# Patient Record
Sex: MALE | Race: BLACK OR AFRICAN AMERICAN | NOT HISPANIC OR LATINO | Employment: OTHER | URBAN - METROPOLITAN AREA
[De-identification: names, ages, dates, MRNs, and addresses within clinical notes are randomized per-mention and may not be internally consistent; named-entity substitution may affect disease eponyms.]

---

## 2017-02-23 ENCOUNTER — PRE VISIT (OUTPATIENT)
Dept: ORTHOPEDICS | Facility: CLINIC | Age: 42
End: 2017-02-23

## 2017-02-23 NOTE — TELEPHONE ENCOUNTER
1.  Date/reason for appt: 3/1/17 7:20AM Rt toe pain appt per Edita from [facility]. Ref by Dr. Lira.  No outside records  2.  Referring provider: Dr. Lira   3.  Call to patient (Yes / No - short description): No, per appt notes there are no outside recs.   4.  Previous care at / records requested from: Recs are in Epic   Ov notes w/ Dr. Lira

## 2017-03-01 ENCOUNTER — OFFICE VISIT (OUTPATIENT)
Dept: ORTHOPEDICS | Facility: CLINIC | Age: 42
End: 2017-03-01

## 2017-03-01 VITALS — WEIGHT: 167.6 LBS | BODY MASS INDEX: 24.82 KG/M2 | HEIGHT: 69 IN

## 2017-03-01 DIAGNOSIS — L84 TYLOMA: Primary | ICD-10-CM

## 2017-03-01 DIAGNOSIS — I73.9 PVD (PERIPHERAL VASCULAR DISEASE) (H): ICD-10-CM

## 2017-03-01 DIAGNOSIS — M21.42 PES PLANUS OF BOTH FEET: ICD-10-CM

## 2017-03-01 DIAGNOSIS — M21.41 PES PLANUS OF BOTH FEET: ICD-10-CM

## 2017-03-01 DIAGNOSIS — M79.675 PAIN IN LEFT TOE(S): ICD-10-CM

## 2017-03-01 DIAGNOSIS — M20.42 HAMMER TOE OF LEFT FOOT: ICD-10-CM

## 2017-03-01 ASSESSMENT — ENCOUNTER SYMPTOMS
SINUS PAIN: 0
NECK MASS: 0
MYALGIAS: 0
SINUS CONGESTION: 0
MUSCLE WEAKNESS: 0
SORE THROAT: 0
SMELL DISTURBANCE: 0
STIFFNESS: 0
BACK PAIN: 0
JOINT SWELLING: 0
HOARSE VOICE: 0
NECK PAIN: 0
ARTHRALGIAS: 0
TROUBLE SWALLOWING: 0
MUSCLE CRAMPS: 0
TASTE DISTURBANCE: 0
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
## 2017-03-01 NOTE — NURSING NOTE
"Reason For Visit:   Chief Complaint   Patient presents with     Musculoskeletal Problem     Pain in left foot.        Ht 1.759 m (5' 9.25\")  Wt 76 kg (167 lb 9.6 oz)  BMI 24.57 kg/m2    Pain Assessment  Patient Currently in Pain: Yes  Primary Pain Location: Foot  Pain Orientation: Left  Pain Descriptors: Other (comment) (unable to give decriptions, states ' Shoe rubs againt th skin \" )  Alleviating Factors: Other (comment) (Nothing makes pain better. )  Aggravating Factors: Other (comment) (Wearing shoes)  "
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
## 2017-03-01 NOTE — MR AVS SNAPSHOT
After Visit Summary   3/1/2017    Maurice Yu    MRN: [id number]           Patient Information     Date Of Birth          1975        Visit Information        Provider Department      3/1/2017 7:05 AM Simone Hudson DPM; [employer] [facility]        Today's Diagnoses     Tyloma    -  1    Pes planus of both feet        Hammer toe of left foot        Pain in left toe(s)        PVD (peripheral vascular disease) (H)           Follow-ups after your visit        Who to contact     Please call your clinic at [phone] to:    Ask questions about your health    Make or cancel appointments    Discuss your medicines    Learn about your test results    Speak to your doctor   If you have compliments or concerns about an experience at your clinic, or if you wish to file a complaint, please contact [facility] Patient Relations at [phone] or email us at [email or web address]         Additional Information About Your Visit        MyChart Information     [employer] is an electronic gateway that provides easy, online access to your medical records. With [employer], you can request a clinic appointment, read your test results, renew a prescription or communicate with your care team.     To sign up for [facility] visit the website at [email or web address]   You will be asked to enter the access code listed below, as well as some personal information. Please follow the directions to create your username and password.     Your access code is: [email or web address]  Expires: 2017  6:31 AM     Your access code will  in 90 days. If you need help or a new code, please contact your [facility] or call [phone] for assistance.        Care EveryWhere ID     This is your Care EveryWhere ID. This could be used by other organizations to access your [place] medical records  [id number]        Your Vitals  "Were     Height BMI (Body Mass Index)                1.759 m (5' 9.25\") 24.57 kg/m2           Blood Pressure from Last 3 Encounters:   11/17/16 (!) 143/100   09/13/16 (!) 156/104   10/16/13 113/74    Weight from Last 3 Encounters:   03/01/17 76 kg (167 lb 9.6 oz)   11/17/16 77.6 kg (171 lb)   09/13/16 76.7 kg (169 lb)              We Performed the Following     TRIM HYPERKERATOTIC SKIN LESION, ONE        Primary Care Provider Office Phone # Fax #    Suri Rao [phone]       [facility] [address] [place] [address]        Thank you!     Thank you for choosing [facility]  for your care. Our goal is always to provide you with excellent care. Hearing back from our patients is one way we can continue to improve our services. Please take a few minutes to complete the written survey that you may receive in the mail after your visit with us. Thank you!             Your Updated Medication List - Protect others around you: Learn how to safely use, store and throw away your medicines at www.disposemymeds.org.          This list is accurate as of: 3/1/17  7:58 AM.  Always use your most recent med list.                   Brand Name Dispense Instructions for use    amitriptyline 50 MG tablet    ELAVIL     Take 25 mg by mouth 1 - 2 at bedtime       ASPIRIN PO      Take 325 mg by mouth Reported on 3/1/2017       FISH OIL + D3 PO      Take by mouth daily       lisinopril-hydrochlorothiazide 10-12.5 MG per tablet    PRINZIDE/ZESTORETIC    90 tablet    Take 1 tablet by mouth daily       loratadine 10 MG tablet    CLARITIN     Take 10 mg by mouth daily prn       MELATONIN PO      Take 10 mg by mouth At Bedtime Reported on 3/1/2017       omeprazole 40 MG capsule    priLOSEC    30 capsule    Take 1 capsule (40 mg) by mouth daily Take one capsule by mouth every day before meal       RANITIDINE HCL PO      Take 300 mg by mouth daily       " spironolactone-hydrochlorothiazide 25-25 MG per tablet    ALDACTAZIDE         tiZANidine 4 MG tablet    ZANAFLEX         Valerian 250 MG Caps      Take 250 mg by mouth

## 2017-08-16 ENCOUNTER — OFFICE VISIT (OUTPATIENT)
Dept: PLASTIC SURGERY | Facility: CLINIC | Age: 42
End: 2017-08-16

## 2017-08-16 VITALS
TEMPERATURE: 98 F | WEIGHT: 172.1 LBS | OXYGEN SATURATION: 96 % | HEART RATE: 80 BPM | DIASTOLIC BLOOD PRESSURE: 107 MMHG | SYSTOLIC BLOOD PRESSURE: 171 MMHG | BODY MASS INDEX: 25.23 KG/M2

## 2017-08-16 DIAGNOSIS — L90.5 SCAR: Primary | ICD-10-CM

## 2017-08-16 ASSESSMENT — PAIN SCALES - GENERAL: PAINLEVEL: NO PAIN (0)
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
## 2017-08-16 NOTE — NURSING NOTE
Chief Complaint   Patient presents with     Consult     consult for scar on neck        Vitals:    08/16/17 0906   BP: (!) 171/107   Pulse: 80   Temp: 98  F (36.7  C)   TempSrc: Oral   SpO2: 96%   Weight: 172 lb 1.6 oz       Body mass index is 25.23 kg/(m^2).      Stef WATTERS
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
## 2018-02-21 ENCOUNTER — OFFICE VISIT (OUTPATIENT)
Dept: FAMILY MEDICINE | Facility: CLINIC | Age: 43
End: 2018-02-21
Payer: COMMERCIAL

## 2018-02-21 VITALS
SYSTOLIC BLOOD PRESSURE: 112 MMHG | HEART RATE: 92 BPM | DIASTOLIC BLOOD PRESSURE: 88 MMHG | WEIGHT: 170.6 LBS | OXYGEN SATURATION: 97 % | TEMPERATURE: 97.6 F | BODY MASS INDEX: 25.01 KG/M2

## 2018-02-21 DIAGNOSIS — R73.9 ELEVATED BLOOD SUGAR: Primary | ICD-10-CM

## 2018-02-21 DIAGNOSIS — R53.83 OTHER FATIGUE: ICD-10-CM

## 2018-02-21 DIAGNOSIS — F51.05 INSOMNIA DUE TO OTHER MENTAL DISORDER: ICD-10-CM

## 2018-02-21 DIAGNOSIS — F32.1 MODERATE MAJOR DEPRESSION (H): ICD-10-CM

## 2018-02-21 DIAGNOSIS — F99 INSOMNIA DUE TO OTHER MENTAL DISORDER: ICD-10-CM

## 2018-02-21 DIAGNOSIS — F43.10 PTSD (POST-TRAUMATIC STRESS DISORDER): ICD-10-CM

## 2018-02-21 DIAGNOSIS — I10 BENIGN ESSENTIAL HYPERTENSION: ICD-10-CM

## 2018-02-21 LAB — GLUCOSE BLD-MCNC: 137 MG/DL (ref 70–99)

## 2018-02-21 PROCEDURE — 82947 ASSAY GLUCOSE BLOOD QUANT: CPT | Performed by: NURSE PRACTITIONER

## 2018-02-21 PROCEDURE — 36416 COLLJ CAPILLARY BLOOD SPEC: CPT | Performed by: NURSE PRACTITIONER

## 2018-02-21 PROCEDURE — 99204 OFFICE O/P NEW MOD 45 MIN: CPT | Performed by: NURSE PRACTITIONER

## 2018-02-21 NOTE — PATIENT INSTRUCTIONS
Today you declined further testing and your fasting blood sugar was elevated 137 today.     I do recommend the labs I ordered for you so please be sure you follow up with your regular Primary Care Provider for Diabetes and for Depression.     You can come here anytime as well, but should have one regular Provider in charge of helping you with your healthcare.     What Is Diabetes?  If you have diabetes, your body does not make enough insulin (a hormone), or the insulin it makes does not work the way it should. Diabetes is a lifelong disease.  Glucose (sugar) is your body's main source of energy. Insulin carries glucose from the bloodstream into your body's cells. But if you have diabetes, glucose builds up in your blood. This is called high blood glucose (high blood sugar).  High blood glucose can lead to damage in many parts of your body, including your eyes, kidneys, heart, blood vessels, nerves and skin.  What are the symptoms of diabetes?  Symptoms include:    Extreme thirst    Needing to urinate more often    Headache    Hunger    Blurred vision    Feeling drowsy or tired    Slow healing after an illness or injury    Frequent infections.  What should I do if I have diabetes?  Your first step is to learn how to manage your diabetes. The goal is to keep your blood glucose as close to normal as possible. (A normal level is 70 to 100.) By doing this, you can prevent or control damage to your body.  To manage your diabetes, you will need to:    Eat a wide range of healthy foods.    Manage your weight.    Be physically active.    Test your blood glucose as prescribed.    Control your blood pressure and cholesterol.    Take your medicines as prescribed.  Are there different kinds of diabetes?  There are two basic kinds of diabetes: type 1 and type 2.  Type 1 diabetes  Type 1 diabetes occurs when the cells that make insulin are destroyed by your body's immune system. Your body can no longer make insulin on its own.  People who have type 1 diabetes must take insulin to manage it.  Type 2 diabetes  Type 2 diabetes occurs when the cells of your body cannot use insulin or glucose normally. Over time, your body cannot make enough insulin to meet your body's needs. This is the most common kind of diabetes.  You are more likely to develop type 2 diabetes if you:    Are overweight    Have high blood pressure    Have high cholesterol    Are not physically active    Have a family history of type 2 diabetes    Are , /, ,  American or     Are a woman who has given birth to a baby weighing over 9 pounds, or you have had gestational diabetes (diabetes that occurs in pregnancy).  For informational purposes only. Not to replace the advice of your health care provider.  Copyright   2006 [facility]. All rights reserved. [employer] [id number] - REV 12/15.

## 2018-02-21 NOTE — MR AVS SNAPSHOT
After Visit Summary   2/21/2018    Maurice Yu    MRN: [id number]           Patient Information     Date Of Birth          1975        Visit Information        Provider Department      2/21/2018 8:45 AM Veronica Baron APRN CNP; [facility]        Today's Diagnoses     Elevated blood sugar    -  1    Benign essential hypertension        Insomnia due to other mental disorder        Moderate major depression (H)        PTSD (post-traumatic stress disorder)        Other fatigue          Care Instructions    Today you declined further testing and your fasting blood sugar was elevated 137 today.     I do recommend the labs I ordered for you so please be sure you follow up with your regular Primary Care Provider for Diabetes and for Depression.     You can come here anytime as well, but should have one regular Provider in charge of helping you with your healthcare.     What Is Diabetes?  If you have diabetes, your body does not make enough insulin (a hormone), or the insulin it makes does not work the way it should. Diabetes is a lifelong disease.  Glucose (sugar) is your body's main source of energy. Insulin carries glucose from the bloodstream into your body's cells. But if you have diabetes, glucose builds up in your blood. This is called high blood glucose (high blood sugar).  High blood glucose can lead to damage in many parts of your body, including your eyes, kidneys, heart, blood vessels, nerves and skin.  What are the symptoms of diabetes?  Symptoms include:    Extreme thirst    Needing to urinate more often    Headache    Hunger    Blurred vision    Feeling drowsy or tired    Slow healing after an illness or injury    Frequent infections.  What should I do if I have diabetes?  Your first step is to learn how to manage your diabetes. The goal is to keep your blood glucose as close to normal as possible. (A normal level is 70 to 100.) By doing  this, you can prevent or control damage to your body.  To manage your diabetes, you will need to:    Eat a wide range of healthy foods.    Manage your weight.    Be physically active.    Test your blood glucose as prescribed.    Control your blood pressure and cholesterol.    Take your medicines as prescribed.  Are there different kinds of diabetes?  There are two basic kinds of diabetes: type 1 and type 2.  Type 1 diabetes  Type 1 diabetes occurs when the cells that make insulin are destroyed by your body's immune system. Your body can no longer make insulin on its own. People who have type 1 diabetes must take insulin to manage it.  Type 2 diabetes  Type 2 diabetes occurs when the cells of your body cannot use insulin or glucose normally. Over time, your body cannot make enough insulin to meet your body's needs. This is the most common kind of diabetes.  You are more likely to develop type 2 diabetes if you:    Are overweight    Have high blood pressure    Have high cholesterol    Are not physically active    Have a family history of type 2 diabetes    Are , /, ,  American or     Are a woman who has given birth to a baby weighing over 9 pounds, or you have had gestational diabetes (diabetes that occurs in pregnancy).  For informational purposes only. Not to replace the advice of your health care provider.  Copyright   2006 [employer]. All rights reserved. [employer] [id number] - REV 12/15.            Follow-ups after your visit        Future tests that were ordered for you today     Open Future Orders        Priority Expected Expires Ordered    Lipid panel reflex to direct LDL Fasting Routine 1/22/2019 2/21/2019 2/21/2018            Who to contact     If you have questions or need follow up information about today's clinic visit or your schedule please contact [facility] directly at [phone].  Normal or non-critical lab  "and imaging results will be communicated to you by MyChart, letter or phone within 4 business days after the clinic has received the results. If you do not hear from us within 7 days, please contact the clinic through [employer] or phone. If you have a critical or abnormal lab result, we will notify you by phone as soon as possible.  Submit refill requests through [employer] or call your pharmacy and they will forward the refill request to us. Please allow 3 business days for your refill to be completed.          Additional Information About Your Visit        [employer] Information     [employer] lets you send messages to your doctor, view your test results, renew your prescriptions, schedule appointments and more. To sign up, go to [email or web address] . Click on \"Log in\" on the left side of the screen, which will take you to the Welcome page. Then click on \"Sign up Now\" on the right side of the page.     You will be asked to enter the access code listed below, as well as some personal information. Please follow the directions to create your username and password.     Your access code is: [email or web address]  Expires: 2018 10:15 AM     Your access code will  in 90 days. If you need help or a new code, please call your [facility] clinic or [phone].        Care EveryWhere ID     This is your Care EveryWhere ID. This could be used by other organizations to access your [facility] medical records  [id number]        Your Vitals Were     Pulse Temperature Pulse Oximetry BMI (Body Mass Index)          92 97.6  F (36.4  C) (Oral) 97% 25.01 kg/m2         Blood Pressure from Last 3 Encounters:   18 112/88   17 (!) 171/107   16 (!) 143/100    Weight from Last 3 Encounters:   18 170 lb 9.6 oz (77.4 kg)   17 172 lb 1.6 oz (78.1 kg)   17 167 lb 9.6 oz (76 kg)              We Performed the Following     CBC with platelets differential     Comprehensive metabolic panel     DEPRESSION ACTION PLAN " (DAP)     Glucose, whole blood     Hemoglobin A1c     TSH with free T4 reflex        Primary Care Provider Fax #    Physician No Ref-Primary [phone]       No address on file        Equal Access to Services     KALEIGH ALVARADO : Hadii aad ku hadrashmimello Hwang, eliza lexixavier, daly lombardo, deniz pride laArnavadele higgins. [facility] [phone].    ATENCIÓN: Si habla español, tiene a chicas disposición servicios gratuitos de asistencia lingüística. Llame al [phone].    We comply with applicable federal civil rights laws and [place] laws. We do not discriminate on the basis of race, color, national origin, age, disability, sex, sexual orientation, or gender identity.            Thank you!     Thank you for choosing [facility]  for your care. Our goal is always to provide you with excellent care. Hearing back from our patients is one way we can continue to improve our services. Please take a few minutes to complete the written survey that you may receive in the mail after your visit with us. Thank you!             Your Updated Medication List - Protect others around you: Learn how to safely use, store and throw away your medicines at www.disposemymeds.org.          This list is accurate as of 2/21/18 10:15 AM.  Always use your most recent med list.                   Brand Name Dispense Instructions for use Diagnosis    lisinopril-hydrochlorothiazide 10-12.5 MG per tablet    PRINZIDE/ZESTORETIC    90 tablet    Take 1 tablet by mouth daily    PTSD (post-traumatic stress disorder), Insomnia, Hypertension goal BP (blood pressure) < 140/90       loratadine 10 MG tablet    CLARITIN     Take 10 mg by mouth daily prn        RANITIDINE HCL PO      Take 300 mg by mouth daily        spironolactone-hydrochlorothiazide 25-25 MG per tablet    ALDACTAZIDE

## 2018-09-06 ENCOUNTER — MEDICAL CORRESPONDENCE (OUTPATIENT)
Dept: HEALTH INFORMATION MANAGEMENT | Facility: CLINIC | Age: 43
End: 2018-09-06

## 2018-09-11 DIAGNOSIS — G47.9 SLEEP DISTURBANCE: Primary | ICD-10-CM

## 2018-09-17 ENCOUNTER — OFFICE VISIT (OUTPATIENT)
Dept: SLEEP MEDICINE | Facility: CLINIC | Age: 43
End: 2018-09-17
Payer: COMMERCIAL

## 2018-09-17 VITALS
SYSTOLIC BLOOD PRESSURE: 141 MMHG | WEIGHT: 171 LBS | DIASTOLIC BLOOD PRESSURE: 94 MMHG | HEART RATE: 77 BPM | HEIGHT: 69 IN | RESPIRATION RATE: 16 BRPM | BODY MASS INDEX: 25.33 KG/M2 | OXYGEN SATURATION: 94 %

## 2018-09-17 DIAGNOSIS — F51.03 PARADOXICAL INSOMNIA: Primary | ICD-10-CM

## 2018-09-17 DIAGNOSIS — I10 ESSENTIAL HYPERTENSION: ICD-10-CM

## 2018-09-17 DIAGNOSIS — G47.9 SLEEP DISTURBANCE: ICD-10-CM

## 2018-09-17 PROCEDURE — 99205 OFFICE O/P NEW HI 60 MIN: CPT | Performed by: NURSE PRACTITIONER

## 2018-09-17 RX ORDER — INSULIN PUMP SYRINGE, 3 ML
EACH MISCELLANEOUS
COMMUNITY
Start: 2018-02-22

## 2018-09-17 RX ORDER — OMEPRAZOLE 40 MG/1
40 CAPSULE, DELAYED RELEASE ORAL
COMMUNITY
Start: 2018-08-07

## 2018-09-17 RX ORDER — TEMAZEPAM 15 MG/1
CAPSULE ORAL
COMMUNITY
Start: 2018-09-06

## 2018-09-17 RX ORDER — LANCETS
EACH MISCELLANEOUS
COMMUNITY
Start: 2018-02-22

## 2018-09-17 NOTE — NURSING NOTE
"Rechecked blood pressure.    Vital signs:      BP: (!) 141/94 Pulse: 77   Resp: 16 SpO2: 94 %     Height: 175.3 cm (5' 9\") Weight: 77.6 kg (171 lb)  Estimated body mass index is 25.25 kg/(m^2) as calculated from the following:    Height as of this encounter: 1.753 m (5' 9\").    Weight as of this encounter: 77.6 kg (171 lb).      Sarah Mcintosh [facility] ~[place]    Patient declined truthpoint because he said he was to tired to answer the questions.  "

## 2018-09-17 NOTE — MR AVS SNAPSHOT
After Visit Summary   9/17/2018    Maurice Yu    MRN: [id number]           Patient Information     Date Of Birth          1975        Visit Information        Provider Department      9/17/2018 9:30 AM Jazmin Viatl APRN CNP; [employer] [facility]        Today's Diagnoses     Paradoxical insomnia    -  1    Sleep disturbance          Care Instructions    Move taking the pill for your thyroid to the morning on an empty stomach  Sleep watch actigraphy: 1 week without sleep pill, 2nd week take sleep pill  Cover clock and don't look at cell phone  Follow up with me  Your BMI is Body mass index is 25.25 kg/(m^2).  Weight management is a personal decision.  If you are interested in exploring weight loss strategies, the following discussion covers the approaches that may be successful. Body mass index (BMI) is one way to tell whether you are at a healthy weight, overweight, or obese. It measures your weight in relation to your height.  A BMI of 18.5 to 24.9 is in the healthy range. A person with a BMI of 25 to 29.9 is considered overweight, and someone with a BMI of 30 or greater is considered obese. More than two-thirds of American adults are considered overweight or obese.  Being overweight or obese increases the risk for further weight gain. Excess weight may lead to heart disease and diabetes.  Creating and following plans for healthy eating and physical activity may help you improve your health.  Weight control is part of healthy lifestyle and includes exercise, emotional health, and healthy eating habits. Careful eating habits lifelong are the mainstay of weight control. Though there are significant health benefits from weight loss, long-term weight loss with diet alone may be very difficult to achieve- studies show long-term success with dietary management in less than 10% of people. Attaining a healthy weight may be especially difficult to  achieve in those with severe obesity. In some cases, medications, devices and surgical management might be considered.  What can you do?  If you are overweight or obese and are interested in methods for weight loss, you should discuss this with your provider.     Consider reducing daily calorie intake by 500 calories.     Keep a food journal.     Avoiding skipping meals, consider cutting portions instead.    Diet combined with exercise helps maintain muscle while optimizing fat loss. Strength training is particularly important for building and maintaining muscle mass. Exercise helps reduce stress, increase energy, and improves fitness. Increasing exercise without diet control, however, may not burn enough calories to loose weight.       Start walking three days a week 10-20 minutes at a time    Work towards walking thirty minutes five days a week     Eventually, increase the speed of your walking for 1-2 minutes at time    In addition, we recommend that you review healthy lifestyles and methods for weight loss available through the National Institutes of Health patient information sites:  http://win.niddk.nih.gov/publications/index.htm    And look into health and wellness programs that may be available through your health insurance provider, employer, local community center, or dorcas club.    Weight management plan: Patient was referred to their PCP to discuss a diet and exercise plan.    Your blood pressure was checked while you were in clinic today.  Please read the guidelines below about what these numbers mean and what you should do about them.  Your systolic blood pressure is the top number.  This is the pressure when the heart is pumping.  Your diastolic blood pressure is the bottom number.  This is the pressure in between beats.  If your systolic blood pressure is less than 120 and your diastolic blood pressure is less than 80, then your blood pressure is normal. There is nothing more that you need to do  "about it  If your systolic blood pressure is 120-139 or your diastolic blood pressure is 80-89, your blood pressure may be higher than it should be.  You should have your blood pressure re-checked within a year by a primary care provider.  If your systolic blood pressure is 140 or greater or your diastolic blood pressure is 90 or greater, you may have high blood pressure.  High blood pressure is treatable, but if left untreated over time it can put you at risk for heart attack, stroke, or kidney failure.  You should have your blood pressure re-checked by a primary care provider within the next four weeks.            Follow-ups after your visit        Follow-up notes from your care team     Return for bp check, actigraphy for 2 wks with a follow up visit.      Future tests that were ordered for you today     Open Future Orders        Priority Expected Expires Ordered    Comprehensive Sleep Study Routine  3/16/2019 9/17/2018            Who to contact     If you have questions or need follow up information about today's clinic visit or your schedule please contact [facility] directly at [phone].  Normal or non-critical lab and imaging results will be communicated to you by MyChart, letter or phone within 4 business days after the clinic has received the results. If you do not hear from us within 7 days, please contact the clinic through [employer] or phone. If you have a critical or abnormal lab result, we will notify you by phone as soon as possible.  Submit refill requests through [facility] or call your pharmacy and they will forward the refill request to us. Please allow 3 business days for your refill to be completed.          Additional Information About Your Visit        [employer] Information     [facility] lets you send messages to your doctor, view your test results, renew your prescriptions, schedule appointments and more. To sign up, go to [email or web address] . Click on \"Log in\" on the " "left side of the screen, which will take you to the Welcome page. Then click on \"Sign up Now\" on the right side of the page.     You will be asked to enter the access code listed below, as well as some personal information. Please follow the directions to create your username and password.     Your access code is: [email or web address]  Expires: 2018  9:35 AM     Your access code will  in 90 days. If you need help or a new code, please call your [facility] clinic or [phone].        Care EveryWhere ID     This is your Care EveryWhere ID. This could be used by other organizations to access your [facility] medical records  [id number]        Your Vitals Were     Pulse Respirations Height Pulse Oximetry BMI (Body Mass Index)       77 16 1.753 m (5' 9\") 94% 25.25 kg/m2        Blood Pressure from Last 3 Encounters:   18 (!) 141/94   18 112/88   17 (!) 171/107    Weight from Last 3 Encounters:   18 77.6 kg (171 lb)   18 77.4 kg (170 lb 9.6 oz)   17 78.1 kg (172 lb 1.6 oz)              We Performed the Following     SLEEP EVALUATION & MANAGEMENT REFERRAL - ADULT -[facility]  [phone] (Age 2 and up)        Primary Care Provider Office Phone # Fax #    [facility] [phone]       [facility]  [address]  [facility] [address]        Equal Access to Services     KALEIGH ALVARADO : Hadii aad ku hadasho Soomaali, waaxda luqadaha, qaybta kaalmada grupo, deniz dow . [facility] [phone].    ATENCIÓN: Si habla español, tiene a chicas disposición servicios gratuitos de asistencia lingüística. Llame al [phone].    We comply with applicable federal civil rights laws and [place] laws. We do not discriminate on the basis of race, color, national origin, age, disability, sex, sexual orientation, or gender identity.            Thank you!     Thank you for choosing [facility] " [place]  for your care. Our goal is always to provide you with excellent care. Hearing back from our patients is one way we can continue to improve our services. Please take a few minutes to complete the written survey that you may receive in the mail after your visit with us. Thank you!             Your Updated Medication List - Protect others around you: Learn how to safely use, store and throw away your medicines at www.disposemymeds.org.          This list is accurate as of 9/17/18 11:12 AM.  Always use your most recent med list.                   Brand Name Dispense Instructions for use Diagnosis    FIFTY50 GLUCOSE METER 2.0 w/Device Kit      as needed for blood glucose monitoring Please fill according to patient's formulary.        KROGER LANCETS 21G Misc      Up to 3 tests per day - Fill based on formulary        lisinopril-hydrochlorothiazide 10-12.5 MG per tablet    PRINZIDE/ZESTORETIC    90 tablet    Take 1 tablet by mouth daily    PTSD (post-traumatic stress disorder), Insomnia, Hypertension goal BP (blood pressure) < 140/90       loratadine 10 MG tablet    CLARITIN     Take 10 mg by mouth daily prn        metFORMIN 500 MG tablet    GLUCOPHAGE     Take 500 mg by mouth        omeprazole 40 MG capsule    priLOSEC     Take 40 mg by mouth        RANITIDINE HCL PO      Take 300 mg by mouth daily        spironolactone-hydrochlorothiazide 25-25 MG per tablet    ALDACTAZIDE          temazepam 15 MG capsule    RESTORIL

## 2018-09-17 NOTE — PATIENT INSTRUCTIONS
Move taking the pill for your thyroid to the morning on an empty stomach  Sleep watch actigraphy: 1 week without sleep pill, 2nd week take sleep pill  Cover clock and don't look at cell phone  Follow up with me  Your BMI is Body mass index is 25.25 kg/(m^2).  Weight management is a personal decision.  If you are interested in exploring weight loss strategies, the following discussion covers the approaches that may be successful. Body mass index (BMI) is one way to tell whether you are at a healthy weight, overweight, or obese. It measures your weight in relation to your height.  A BMI of 18.5 to 24.9 is in the healthy range. A person with a BMI of 25 to 29.9 is considered overweight, and someone with a BMI of 30 or greater is considered obese. More than two-thirds of American adults are considered overweight or obese.  Being overweight or obese increases the risk for further weight gain. Excess weight may lead to heart disease and diabetes.  Creating and following plans for healthy eating and physical activity may help you improve your health.  Weight control is part of healthy lifestyle and includes exercise, emotional health, and healthy eating habits. Careful eating habits lifelong are the mainstay of weight control. Though there are significant health benefits from weight loss, long-term weight loss with diet alone may be very difficult to achieve- studies show long-term success with dietary management in less than 10% of people. Attaining a healthy weight may be especially difficult to achieve in those with severe obesity. In some cases, medications, devices and surgical management might be considered.  What can you do?  If you are overweight or obese and are interested in methods for weight loss, you should discuss this with your provider.     Consider reducing daily calorie intake by 500 calories.     Keep a food journal.     Avoiding skipping meals, consider cutting portions instead.    Diet combined with  exercise helps maintain muscle while optimizing fat loss. Strength training is particularly important for building and maintaining muscle mass. Exercise helps reduce stress, increase energy, and improves fitness. Increasing exercise without diet control, however, may not burn enough calories to loose weight.       Start walking three days a week 10-20 minutes at a time    Work towards walking thirty minutes five days a week     Eventually, increase the speed of your walking for 1-2 minutes at time    In addition, we recommend that you review healthy lifestyles and methods for weight loss available through the National Institutes of Health patient information sites:  http://win.niddk.nih.gov/publications/index.htm    And look into health and wellness programs that may be available through your health insurance provider, employer, local community center, or [employer]or[employer] club.    Weight management plan: Patient was referred to their PCP to discuss a diet and exercise plan.    Your blood pressure was checked while you were in clinic today.  Please read the guidelines below about what these numbers mean and what you should do about them.  Your systolic blood pressure is the top number.  This is the pressure when the heart is pumping.  Your diastolic blood pressure is the bottom number.  This is the pressure in between beats.  If your systolic blood pressure is less than 120 and your diastolic blood pressure is less than 80, then your blood pressure is normal. There is nothing more that you need to do about it  If your systolic blood pressure is 120-139 or your diastolic blood pressure is 80-89, your blood pressure may be higher than it should be.  You should have your blood pressure re-checked within a year by a primary care provider.  If your systolic blood pressure is 140 or greater or your diastolic blood pressure is 90 or greater, you may have high blood pressure.  High blood pressure is treatable, but if left untreated  over time it can put you at risk for heart attack, stroke, or kidney failure.  You should have your blood pressure re-checked by a primary care provider within the next four weeks.
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
## 2018-09-17 NOTE — Clinical Note
See completed sleep consult, note info re: levothyroxine and Patient to follow up with Primary Care provider regarding elevated blood pressure.  ELISE Nascimento, CNP-BC Sleep Medicine
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
## 2018-10-08 ENCOUNTER — OFFICE VISIT (OUTPATIENT)
Dept: SLEEP MEDICINE | Facility: CLINIC | Age: 43
End: 2018-10-08
Payer: COMMERCIAL

## 2018-10-08 DIAGNOSIS — G47.19 EXCESSIVE DAYTIME SLEEPINESS: Primary | ICD-10-CM

## 2018-10-08 PROCEDURE — 95803 ACTIGRAPHY TESTING: CPT | Performed by: NURSE PRACTITIONER

## 2018-10-08 NOTE — PROGRESS NOTES
Patient presented to clinic for  and demonstration of the Actigraphy and sleep diary. Patient was set up and instructed use. Patient verbalized understanding and demonstrated set up back to me. Patient will be returning device by 10/22/18.    Patient was given sleep logs and written instructions for use.

## 2018-10-08 NOTE — MR AVS SNAPSHOT
"              After Visit Summary   10/8/2018    Maurice Yu    MRN: [id number]           Patient Information     Date Of Birth          1975        Visit Information        Provider Department      10/8/2018 10:15 AM [employer]; SLEEP STUDY  [facility]        Today's Diagnoses     Excessive daytime sleepiness    -  1       Follow-ups after your visit        Your next 10 appointments already scheduled     Oct 22, 2018 10:30 AM CDT   Actigraphy Drop Off with SLEEP STUDY  7   [facility])    [address] [place] [id number] [phone]            Oct 22, 2018 11:00 AM CDT   Return Sleep Patient with ELISE Barrow CNP   [facility])    [address] [place] [id number] [phone]              Who to contact     If you have questions or need follow up information about today's clinic visit or your schedule please contact [facility] directly at [phone].  Normal or non-critical lab and imaging results will be communicated to you by Zipmarkhart, letter or phone within 4 business days after the clinic has received the results. If you do not hear from us within 7 days, please contact the clinic through Zipmarkhart or phone. If you have a critical or abnormal lab result, we will notify you by phone as soon as possible.  Submit refill requests through [employer] or call your pharmacy and they will forward the refill request to us. Please allow 3 business days for your refill to be completed.          Additional Information About Your Visit        MyChart Information     [employer] lets you send messages to your doctor, view your test results, renew your prescriptions, schedule appointments and more. To sign up, go to [email or web address] . Click on \"Log in\" on " "the left side of the screen, which will take you to the Welcome page. Then click on \"Sign up Now\" on the right side of the page.     You will be asked to enter the access code listed below, as well as some personal information. Please follow the directions to create your username and password.     Your access code is: [email or web address]  Expires: 2018  9:35 AM     Your access code will  in 90 days. If you need help or a new code, please call your [place] clinic or [phone].        Care EveryWhere ID     This is your Care EveryWhere ID. This could be used by other organizations to access your [place] medical records  [id number]         Blood Pressure from Last 3 Encounters:   18 (!) 141/94   18 112/88   17 (!) 171/107    Weight from Last 3 Encounters:   18 77.6 kg (171 lb)   18 77.4 kg (170 lb 9.6 oz)   17 78.1 kg (172 lb 1.6 oz)              Today, you had the following     No orders found for display       Primary Care Provider Office Phone # Fax #    [facility] [phone]        [address]  [facility] [address]        Equal Access to Services     KALEIGH ALVARADO AH: Hadii pooja vieyrao Solexy, waaxda luqadaha, qaybta kaalmada adeegyada, deniz gil hayadele dow . [facility] [phone].    ATENCIÓN: Si habla español, tiene a chicas disposición servicios gratuitos de asistencia lingüística. Llame al [phone].    We comply with applicable federal civil rights laws and [place] laws. We do not discriminate on the basis of race, color, national origin, age, disability, sex, sexual orientation, or gender identity.            Thank you!     Thank you for choosing [facility]  for your care. Our goal is always to provide you with excellent care. Hearing back from our patients is one way we can continue to improve our services. Please take a few minutes to complete the written survey that you may " receive in the mail after your visit with us. Thank you!             Your Updated Medication List - Protect others around you: Learn how to safely use, store and throw away your medicines at www.disposemymeds.org.          This list is accurate as of 10/8/18  3:37 PM.  Always use your most recent med list.                   Brand Name Dispense Instructions for use Diagnosis    FIFTY50 GLUCOSE METER 2.0 w/Device Kit      as needed for blood glucose monitoring Please fill according to patient's formulary.        [employer]ER LANCETS 21G Misc      Up to 3 tests per day - Fill based on formulary        lisinopril-hydrochlorothiazide 10-12.5 MG per tablet    PRINZIDE/ZESTORETIC    90 tablet    Take 1 tablet by mouth daily    PTSD (post-traumatic stress disorder), Insomnia, Hypertension goal BP (blood pressure) < 140/90       loratadine 10 MG tablet    CLARITIN     Take 10 mg by mouth daily prn        metFORMIN 500 MG tablet    GLUCOPHAGE     Take 500 mg by mouth        omeprazole 40 MG capsule    priLOSEC     Take 40 mg by mouth        RANITIDINE HCL PO      Take 300 mg by mouth daily        spironolactone-hydrochlorothiazide 25-25 MG per tablet    ALDACTAZIDE          temazepam 15 MG capsule    RESTORIL

## 2018-10-22 ENCOUNTER — OFFICE VISIT (OUTPATIENT)
Dept: SLEEP MEDICINE | Facility: CLINIC | Age: 43
End: 2018-10-22
Payer: COMMERCIAL

## 2018-10-22 ENCOUNTER — DOCUMENTATION ONLY (OUTPATIENT)
Dept: SLEEP MEDICINE | Facility: CLINIC | Age: 43
End: 2018-10-22
Payer: COMMERCIAL

## 2018-10-22 VITALS
RESPIRATION RATE: 16 BRPM | HEART RATE: 101 BPM | BODY MASS INDEX: 25.18 KG/M2 | SYSTOLIC BLOOD PRESSURE: 135 MMHG | WEIGHT: 170 LBS | OXYGEN SATURATION: 95 % | HEIGHT: 69 IN | DIASTOLIC BLOOD PRESSURE: 93 MMHG

## 2018-10-22 DIAGNOSIS — Z78.9 INEFFECTIVE SELF HEALTH MANAGEMENT: ICD-10-CM

## 2018-10-22 DIAGNOSIS — F51.03 PARADOXICAL INSOMNIA: Primary | ICD-10-CM

## 2018-10-22 PROCEDURE — 99214 OFFICE O/P EST MOD 30 MIN: CPT | Performed by: NURSE PRACTITIONER

## 2018-10-22 NOTE — NURSING NOTE
"    Chief Complaint   Patient presents with     Study Results     Acti       Initial BP (!) 135/93  Pulse 101  Resp 16  Ht 1.753 m (5' 9.02\")  Wt 77.1 kg (170 lb)  SpO2 95%  BMI 25.09 kg/m2 Estimated body mass index is 25.09 kg/(m^2) as calculated from the following:    Height as of this encounter: 1.753 m (5' 9.02\").    Weight as of this encounter: 77.1 kg (170 lb).    Medication Reconciliation: complete    Neck circumference:  inches /  centimeters.    DME:     [facility] Mcintosh [facility]       "

## 2018-10-22 NOTE — MR AVS SNAPSHOT
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  Will talk with Dr. Lira re: sleep pill and working on anxiety, check on timing of medications.  Bring in sleep diary if you have it.      Your BMI is Body mass index is 25.09 kg/(m^2).  Weight management is a personal decision.  If you are interested in exploring weight loss strategies, the following discussion covers the approaches that may be successful. Body mass index (BMI) is one way to tell whether you are at a healthy weight, overweight, or obese. It measures your weight in relation to your height.  A BMI of 18.5 to 24.9 is in the healthy range. A person with a BMI of 25 to 29.9 is considered overweight, and someone with a BMI of 30 or greater is considered obese. More than two-thirds of American adults are considered overweight or obese.  Being overweight or obese increases the risk for further weight gain. Excess weight may lead to heart disease and diabetes.  Creating and following plans for healthy eating and physical activity may help you improve your health.  Weight control is part of healthy lifestyle and includes exercise, emotional health, and healthy eating habits. Careful eating habits lifelong are the mainstay of weight control. Though there are significant health benefits from weight loss, long-term weight loss with diet alone may be very difficult to achieve- studies show long-term success with dietary management in less than 10% of people. Attaining a healthy weight may be especially difficult to achieve in those with  severe obesity. In some cases, medications, devices and surgical management might be considered.  What can you do?  If you are overweight or obese and are interested in methods for weight loss, you should discuss this with your provider.     Consider reducing daily calorie intake by 500 calories.     Keep a food journal.     Avoiding skipping meals, consider cutting portions instead.    Diet combined with exercise helps maintain muscle while optimizing fat loss. Strength training is particularly important for building and maintaining muscle mass. Exercise helps reduce stress, increase energy, and improves fitness. Increasing exercise without diet control, however, may not burn enough calories to loose weight.       Start walking three days a week 10-20 minutes at a time    Work towards walking thirty minutes five days a week     Eventually, increase the speed of your walking for 1-2 minutes at time    In addition, we recommend that you review healthy lifestyles and methods for weight loss available through the National Institutes of Health patient information sites:  http://win.niddk.nih.gov/publications/index.htm    And look into health and wellness programs that may be available through your health insurance provider, employer, local community center, or dorcas club.    Weight management plan: Patient was referred to their PCP to discuss a diet and exercise plan.     Your blood pressure was checked while you were in clinic today.  Please read the guidelines below about what these numbers mean and what you should do about them.  Your systolic blood pressure is the top number.  This is the pressure when the heart is pumping.  Your diastolic blood pressure is the bottom number.  This is the pressure in between beats.  If your systolic blood pressure is less than 120 and your diastolic blood pressure is less than 80, then your blood pressure is normal. There is nothing more that you need to do about it  If your  "systolic blood pressure is 120-139 or your diastolic blood pressure is 80-89, your blood pressure may be higher than it should be.  You should have your blood pressure re-checked within a year by a primary care provider.  If your systolic blood pressure is 140 or greater or your diastolic blood pressure is 90 or greater, you may have high blood pressure.  High blood pressure is treatable, but if left untreated over time it can put you at risk for heart attack, stroke, or kidney failure.  You should have your blood pressure re-checked by a primary care provider within the next four weeks.                Follow-ups after your visit        Follow-up notes from your care team     Return if symptoms worsen or fail to improve.      Who to contact     If you have questions or need follow up information about today's clinic visit or your schedule please contact [facility] directly at [phone].  Normal or non-critical lab and imaging results will be communicated to you by MyChart, letter or phone within 4 business days after the clinic has received the results. If you do not hear from us within 7 days, please contact the clinic through MyChart or phone. If you have a critical or abnormal lab result, we will notify you by phone as soon as possible.  Submit refill requests through [employer] or call your pharmacy and they will forward the refill request to us. Please allow 3 business days for your refill to be completed.          Additional Information About Your Visit        Care EveryWhere ID     This is your Care EveryWhere ID. This could be used by other organizations to access your [place] [facility] medical records  [id number]        Your Vitals Were     Pulse Respirations Height Pulse Oximetry BMI (Body Mass Index)       101 16 1.753 m (5' 9.02\") 95% 25.09 kg/m2        Blood Pressure from Last 3 Encounters:   10/22/18 (!) 135/93   09/17/18 (!) 141/94   02/21/18 112/88    Weight from Last 3 Encounters: "   10/22/18 77.1 kg (170 lb)   09/17/18 77.6 kg (171 lb)   02/21/18 77.4 kg (170 lb 9.6 oz)              Today, you had the following     No orders found for display       Primary Care Provider Office Phone # Fax #    [facility] [phone]       [address]  [facility] [place] [address]        Equal Access to Services     KALEIGH ALVARADO : Hadii aad ku hadasho Soomaali, waaxda luqadaha, qaybta kaalmada adeegyada, waxay idiin hayaan adeeg bigg paloma . So [facility] [phone].    ATENCIÓN: Si melissa narvaez, tiene a chicas disposición servicios gratuitos de asistencia lingüística. Amitaame al [phone].    We comply with applicable federal civil rights laws and [place] laws. We do not discriminate on the basis of race, color, national origin, age, disability, sex, sexual orientation, or gender identity.            Thank you!     Thank you for choosing [facility]  for your care. Our goal is always to provide you with excellent care. Hearing back from our patients is one way we can continue to improve our services. Please take a few minutes to complete the written survey that you may receive in the mail after your visit with us. Thank you!             Your Updated Medication List - Protect others around you: Learn how to safely use, store and throw away your medicines at www.disposemymeds.org.          This list is accurate as of 10/22/18 11:57 AM.  Always use your most recent med list.                   Brand Name Dispense Instructions for use Diagnosis    FIFTY50 GLUCOSE METER 2.0 w/Device Kit      as needed for blood glucose monitoring Please fill according to patient's formulary.        [employer] LANCETS 21G Misc      Up to 3 tests per day - Fill based on formulary        lisinopril-hydrochlorothiazide 10-12.5 MG per tablet    PRINZIDE/ZESTORETIC    90 tablet    Take 1 tablet by mouth daily    PTSD (post-traumatic stress disorder), Insomnia, Hypertension goal BP (blood  pressure) < 140/90       loratadine 10 MG tablet    CLARITIN     Take 10 mg by mouth daily prn        metFORMIN 500 MG tablet    GLUCOPHAGE     Take 500 mg by mouth        omeprazole 40 MG capsule    priLOSEC     Take 40 mg by mouth        RANITIDINE HCL PO      Take 300 mg by mouth daily        spironolactone-hydrochlorothiazide 25-25 MG per tablet    ALDACTAZIDE          temazepam 15 MG capsule    RESTORIL
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
## 2018-10-22 NOTE — PATIENT INSTRUCTIONS
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  Will talk with Dr. Pankaj woo: sleep pill and working on anxiety, check on timing of medications.  Bring in sleep diary if you have it.      Your BMI is Body mass index is 25.09 kg/(m^2).  Weight management is a personal decision.  If you are interested in exploring weight loss strategies, the following discussion covers the approaches that may be successful. Body mass index (BMI) is one way to tell whether you are at a healthy weight, overweight, or obese. It measures your weight in relation to your height.  A BMI of 18.5 to 24.9 is in the healthy range. A person with a BMI of 25 to 29.9 is considered overweight, and someone with a BMI of 30 or greater is considered obese. More than two-thirds of American adults are considered overweight or obese.  Being overweight or obese increases the risk for further weight gain. Excess weight may lead to heart disease and diabetes.  Creating and following plans for healthy eating and physical activity may help you improve your health.  Weight control is part of healthy lifestyle and includes exercise, emotional health, and healthy eating habits. Careful eating habits lifelong are the mainstay of weight control. Though there are significant health benefits from weight loss, long-term weight loss with diet alone may be very difficult to achieve- studies show long-term success with dietary management in less than 10% of people. Attaining a healthy weight may be especially difficult to achieve in those with severe obesity. In some cases, medications, devices and surgical management might be considered.  What can you do?  If you are overweight or obese and are interested in methods for weight loss, you should discuss this with your provider.     Consider reducing daily calorie intake by 500 calories.     Keep a food journal.     Avoiding skipping  meals, consider cutting portions instead.    Diet combined with exercise helps maintain muscle while optimizing fat loss. Strength training is particularly important for building and maintaining muscle mass. Exercise helps reduce stress, increase energy, and improves fitness. Increasing exercise without diet control, however, may not burn enough calories to loose weight.       Start walking three days a week 10-20 minutes at a time    Work towards walking thirty minutes five days a week     Eventually, increase the speed of your walking for 1-2 minutes at time    In addition, we recommend that you review healthy lifestyles and methods for weight loss available through the National Institutes of Health patient information sites:  http://win.niddk.nih.gov/publications/index.htm    And look into health and wellness programs that may be available through your health insurance provider, employer, local community center, or [employer]orcas club.    Weight management plan: Patient was referred to their PCP to discuss a diet and exercise plan.     Your blood pressure was checked while you were in clinic today.  Please read the guidelines below about what these numbers mean and what you should do about them.  Your systolic blood pressure is the top number.  This is the pressure when the heart is pumping.  Your diastolic blood pressure is the bottom number.  This is the pressure in between beats.  If your systolic blood pressure is less than 120 and your diastolic blood pressure is less than 80, then your blood pressure is normal. There is nothing more that you need to do about it  If your systolic blood pressure is 120-139 or your diastolic blood pressure is 80-89, your blood pressure may be higher than it should be.  You should have your blood pressure re-checked within a year by a primary care provider.  If your systolic blood pressure is 140 or greater or your diastolic blood pressure is 90 or greater, you may have high blood  pressure.  High blood pressure is treatable, but if left untreated over time it can put you at risk for heart attack, stroke, or kidney failure.  You should have your blood pressure re-checked by a primary care provider within the next four weeks.
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
## 2021-02-04 ENCOUNTER — HOSPITAL ENCOUNTER (EMERGENCY)
Facility: CLINIC | Age: 46
Discharge: HOME | End: 2021-02-04
Attending: EMERGENCY MEDICINE | Admitting: EMERGENCY MEDICINE
Payer: MEDICAID

## 2021-02-04 VITALS
HEIGHT: 72 IN | WEIGHT: 198 LBS | DIASTOLIC BLOOD PRESSURE: 88 MMHG | RESPIRATION RATE: 18 BRPM | OXYGEN SATURATION: 98 % | SYSTOLIC BLOOD PRESSURE: 124 MMHG | HEART RATE: 89 BPM | BODY MASS INDEX: 26.82 KG/M2
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
## 2021-02-04 DIAGNOSIS — R22.0 FACIAL SWELLING: ICD-10-CM

## 2021-02-04 PROCEDURE — 250N000013 HC RX MED GY IP 250 OP 250 PS 637: Performed by: EMERGENCY MEDICINE

## 2021-02-04 PROCEDURE — 99283 EMERGENCY DEPT VISIT LOW MDM: CPT

## 2021-02-04 RX ORDER — ACETAMINOPHEN 325 MG/1
650 TABLET ORAL ONCE
Status: COMPLETED | OUTPATIENT
Start: 2021-02-04 | End: 2021-02-04

## 2021-02-04 RX ADMIN — ACETAMINOPHEN 650 MG: 325 TABLET, FILM COATED ORAL at 13:41

## 2021-02-04 ASSESSMENT — ENCOUNTER SYMPTOMS
ABDOMINAL PAIN: 0
FREQUENCY: 0
DIFFICULTY URINATING: 0
CONSTIPATION: 0
DYSURIA: 0
DIARRHEA: 0
BLOOD IN STOOL: 0
HEMATURIA: 0
BACK PAIN: 1
NECK PAIN: 1

## 2021-02-04 ASSESSMENT — MIFFLIN-ST. JEOR: SCORE: 1806.12
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
## 2021-02-09 ENCOUNTER — OFFICE VISIT (OUTPATIENT)
Dept: INTERNAL MEDICINE | Facility: CLINIC | Age: 46
End: 2021-02-09

## 2021-02-09 VITALS
DIASTOLIC BLOOD PRESSURE: 84 MMHG | TEMPERATURE: 98.5 F | OXYGEN SATURATION: 98 % | WEIGHT: 198.1 LBS | HEART RATE: 81 BPM | BODY MASS INDEX: 26.87 KG/M2 | SYSTOLIC BLOOD PRESSURE: 122 MMHG
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
## 2021-02-09 DIAGNOSIS — S01.81XD FACIAL LACERATION, SUBSEQUENT ENCOUNTER: ICD-10-CM

## 2021-02-09 DIAGNOSIS — Z48.02 ENCOUNTER FOR REMOVAL OF SUTURES: Primary | ICD-10-CM

## 2021-02-09 PROCEDURE — 99024 POSTOP FOLLOW-UP VISIT: CPT | Performed by: INTERNAL MEDICINE
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
## 2021-05-04 ENCOUNTER — HOSPITAL ENCOUNTER (OUTPATIENT)
Dept: ULTRASOUND IMAGING | Facility: CLINIC | Age: 46
Discharge: HOME | End: 2021-05-04
Attending: NURSE PRACTITIONER | Admitting: NURSE PRACTITIONER
Payer: COMMERCIAL

## 2021-05-04 DIAGNOSIS — E03.9 HYPOTHYROIDISM, UNSPECIFIED TYPE: ICD-10-CM

## 2021-05-04 DIAGNOSIS — Z86.39 HX OF GOITER: ICD-10-CM

## 2021-05-04 PROCEDURE — 76536 US EXAM OF HEAD AND NECK: CPT | Mod: 26 | Performed by: RADIOLOGY

## 2021-05-04 PROCEDURE — 76536 US EXAM OF HEAD AND NECK: CPT

## 2021-07-08 ENCOUNTER — OFFICE VISIT (OUTPATIENT)
Dept: URGENT CARE | Facility: URGENT CARE | Age: 46
End: 2021-07-08
Payer: MEDICAID

## 2021-07-08 VITALS
RESPIRATION RATE: 16 BRPM | SYSTOLIC BLOOD PRESSURE: 133 MMHG | DIASTOLIC BLOOD PRESSURE: 90 MMHG | TEMPERATURE: 96.7 F | OXYGEN SATURATION: 100 % | HEART RATE: 60 BPM | WEIGHT: 175 LBS

## 2021-07-08 DIAGNOSIS — H57.13 PAIN OF BOTH EYES: Primary | ICD-10-CM

## 2021-07-08 PROCEDURE — 99203 OFFICE O/P NEW LOW 30 MIN: CPT | Performed by: PHYSICIAN ASSISTANT

## 2021-07-08 ASSESSMENT — ENCOUNTER SYMPTOMS
EYE PAIN: 1
EYE ITCHING: 0
NEUROLOGICAL NEGATIVE: 1
GASTROINTESTINAL NEGATIVE: 1
CONSTITUTIONAL NEGATIVE: 1
EYE DISCHARGE: 0
RESPIRATORY NEGATIVE: 1
PHOTOPHOBIA: 0
PSYCHIATRIC NEGATIVE: 1
MUSCULOSKELETAL NEGATIVE: 1
EYE REDNESS: 0
CARDIOVASCULAR NEGATIVE: 1

## 2021-07-08 ASSESSMENT — VISUAL ACUITY: OU: 1

## 2021-07-08 NOTE — PROGRESS NOTES
Pain of both eyes  - EYE ADULT REFERRAL; Future    The etiology of patient's eye pain is unclear. Patient was asked to follow up with ophthalmology for further workup.     20 minutes spent on the date of the encounter doing chart review, history and exam, documentation and further activities per the note     See Patient Instructions  Patient Instructions     Patient Education     Acute Pain, Uncertain Cause  Pain can be caused by many conditions that range from very minor to very serious. In some cases, though, pain comes and goes with no apparent cause.   We were not able to find the exact cause for your pain. At this time there is no sign of any serious illness causing your pain. More tests may be needed to determine the cause. In many cases, pain like this goes away by itself.   Home care  Take any medicines as prescribed. If another medicine was not prescribed for pain, you can take an over-the-counter pain medicine such as ibuprofen or acetaminophen. Use these as directed on the label.     Follow-up care  Follow up with your healthcare provider or our staff as directed.  When to seek medical advice  Call your healthcare provider for any of the following:    Pain changes in pattern    Pain doesn't lessen or gets worse    New symptoms appear    Fever of 100.4 F (38 C) or higher, or as directed by your healthcare provider  [employer]-pins last reviewed this educational content on 4/1/2018 2000-2021 [employer]. All rights reserved. This information is not intended as a substitute for professional medical care. Always follow your healthcare professional's instructions.               Elkin Moreno PA-C  [facility]    Subjective   46 year old who presents to clinic today for the following health issues:    Eye Problem       HPI     Eye(s) Problem  Onset/Duration: 3-4 weeks  Description:   Location: Bilateral  Pain: YES  Redness: no  Accompanying Signs &  Symptoms:  Discharge/mattering: no  Swelling: no  Visual changes: no  Fever: no  Nasal Congestion: no  Bothered by bright lights: no  History:  Trauma: no  Foreign body exposure: no  Wearing contacts: no  Precipitating or alleviating factors: None  Therapies tried and outcome: None      Review of Systems   Review of Systems   Constitutional: Negative.    HENT: Negative.    Eyes: Positive for pain. Negative for photophobia, discharge, redness, itching and visual disturbance.   Respiratory: Negative.    Cardiovascular: Negative.    Gastrointestinal: Negative.    Genitourinary: Negative.    Musculoskeletal: Negative.    Neurological: Negative.    Psychiatric/Behavioral: Negative.         Objective    Temp: 96.7  F (35.9  C) Temp src: Tympanic BP: (!) 133/90 Pulse: 60   Resp: 16 SpO2: 100 %       Physical Exam   Physical Exam  Constitutional:       General: He is not in acute distress.     Appearance: Normal appearance. He is normal weight. He is not ill-appearing, toxic-appearing or diaphoretic.   HENT:      Head: Normocephalic and atraumatic.   Eyes:      General: Lids are normal. Lids are everted, no foreign bodies appreciated. Vision grossly intact. Gaze aligned appropriately. No allergic shiner, visual field deficit or scleral icterus.        Right eye: No foreign body, discharge or hordeolum.         Left eye: No foreign body, discharge or hordeolum.      Extraocular Movements: Extraocular movements intact.      Conjunctiva/sclera: Conjunctivae normal.      Right eye: Right conjunctiva is not injected. No chemosis, exudate or hemorrhage.     Left eye: Left conjunctiva is not injected. No chemosis, exudate or hemorrhage.     Pupils: Pupils are equal, round, and reactive to light.   Neck:      Musculoskeletal: Normal range of motion and neck supple. No neck rigidity or muscular tenderness.      Vascular: No carotid bruit.   Cardiovascular:      Rate and Rhythm: Normal rate and regular rhythm.      Pulses: Normal  pulses.      Heart sounds: Normal heart sounds. No murmur. No friction rub. No gallop.    Pulmonary:      Effort: Pulmonary effort is normal. No respiratory distress.      Breath sounds: Normal breath sounds.   Lymphadenopathy:      Cervical: No cervical adenopathy.   Neurological:      General: No focal deficit present.      Mental Status: He is alert and oriented to person, place, and time. Mental status is at baseline.      Gait: Gait normal.   Psychiatric:         Mood and Affect: Mood normal.         Behavior: Behavior normal.         Thought Content: Thought content normal.         Judgment: Judgment normal.

## 2021-07-08 NOTE — PATIENT INSTRUCTIONS
Patient Education     Acute Pain, Uncertain Cause  Pain can be caused by many conditions that range from very minor to very serious. In some cases, though, pain comes and goes with no apparent cause.   We were not able to find the exact cause for your pain. At this time there is no sign of any serious illness causing your pain. More tests may be needed to determine the cause. In many cases, pain like this goes away by itself.   Home care  Take any medicines as prescribed. If another medicine was not prescribed for pain, you can take an over-the-counter pain medicine such as ibuprofen or acetaminophen. Use these as directed on the label.     Follow-up care  Follow up with your healthcare provider or our staff as directed.  When to seek medical advice  Call your healthcare provider for any of the following:    Pain changes in pattern    Pain doesn't lessen or gets worse    New symptoms appear    Fever of 100.4 F (38 C) or higher, or as directed by your healthcare provider  [employer] last reviewed this educational content on 4/1/2018 2000-2021 [employer]. All rights reserved. This information is not intended as a substitute for professional medical care. Always follow your healthcare professional's instructions.

## 2021-07-13 ENCOUNTER — OFFICE VISIT (OUTPATIENT)
Dept: INTERNAL MEDICINE | Facility: CLINIC | Age: 46
End: 2021-07-13
Payer: MEDICAID

## 2021-07-13 VITALS
SYSTOLIC BLOOD PRESSURE: 128 MMHG | DIASTOLIC BLOOD PRESSURE: 84 MMHG | HEART RATE: 50 BPM | WEIGHT: 173 LBS | TEMPERATURE: 98.1 F | BODY MASS INDEX: 24.22 KG/M2 | HEIGHT: 71 IN | OXYGEN SATURATION: 98 %

## 2021-07-13 DIAGNOSIS — Z11.59 ENCOUNTER FOR HEPATITIS C SCREENING TEST FOR LOW RISK PATIENT: ICD-10-CM

## 2021-07-13 DIAGNOSIS — Z13.1 ENCOUNTER FOR SCREENING FOR DIABETES MELLITUS: ICD-10-CM

## 2021-07-13 DIAGNOSIS — Z23 ENCOUNTER FOR IMMUNIZATION: ICD-10-CM

## 2021-07-13 DIAGNOSIS — Z00.00 ROUTINE GENERAL MEDICAL EXAMINATION AT A HEALTH CARE FACILITY: Primary | ICD-10-CM

## 2021-07-13 DIAGNOSIS — Z13.220 ENCOUNTER FOR SCREENING FOR LIPID DISORDER: ICD-10-CM

## 2021-07-13 PROCEDURE — 36415 COLL VENOUS BLD VENIPUNCTURE: CPT | Performed by: INTERNAL MEDICINE

## 2021-07-13 PROCEDURE — 90471 IMMUNIZATION ADMIN: CPT | Performed by: INTERNAL MEDICINE

## 2021-07-13 PROCEDURE — 86803 HEPATITIS C AB TEST: CPT | Performed by: INTERNAL MEDICINE

## 2021-07-13 PROCEDURE — 90715 TDAP VACCINE 7 YRS/> IM: CPT | Performed by: INTERNAL MEDICINE

## 2021-07-13 PROCEDURE — 99396 PREV VISIT EST AGE 40-64: CPT | Mod: 25 | Performed by: INTERNAL MEDICINE

## 2021-07-13 ASSESSMENT — ENCOUNTER SYMPTOMS
ARTHRALGIAS: 0
HEMATURIA: 0
ABDOMINAL PAIN: 0
COUGH: 0
PARESTHESIAS: 0
SHORTNESS OF BREATH: 0
NERVOUS/ANXIOUS: 0
MYALGIAS: 0
DIZZINESS: 0
DYSURIA: 0
HEADACHES: 0
SORE THROAT: 0
EYE PAIN: 1
HEARTBURN: 0
FEVER: 0
CONSTIPATION: 0
WEAKNESS: 0
JOINT SWELLING: 0
HEMATOCHEZIA: 0
FREQUENCY: 0
DIARRHEA: 0
CHILLS: 0
NAUSEA: 0
PALPITATIONS: 0

## 2021-07-13 ASSESSMENT — MIFFLIN-ST. JEOR: SCORE: 1676.85

## 2021-07-13 NOTE — PROGRESS NOTES
SUBJECTIVE:   CC: Cr Liu is an 46 year old male who presents for preventative health visit.     Patient has been advised of split billing requirements and indicates understanding: Yes     Healthy Habits:     Getting at least 3 servings of Calcium per day:  Yes    Bi-annual eye exam:  NO    Dental care twice a year:  NO    Sleep apnea or symptoms of sleep apnea:  None    Diet:  Regular (no restrictions)    Frequency of exercise:  2-3 days/week    Duration of exercise:  15-30 minutes    Taking medications regularly:  Yes    Medication side effects:  None    PHQ-2 Total Score: 0    Additional concerns today:  No    Cr presents today for a physical exam. He was recently seen in urgent care with complaints of acute eye pain. Cause was unclear. He was referred for an eye exam. Today he tells me he has an appointment for that on Friday. He defers any discussion about this condition today.    Today's PHQ-2 Score:   PHQ-2 ( 1999 [employer]) 7/13/2021   Q1: Little interest or pleasure in doing things 0   Q2: Feeling down, depressed or hopeless 0   PHQ-2 Score 0   Q1: Little interest or pleasure in doing things Not at all   Q2: Feeling down, depressed or hopeless Not at all   PHQ-2 Score 0     Abuse: Current or Past(Physical, Sexual or Emotional)- No  Do you feel safe in your environment? Yes    Social History     Tobacco Use     Smoking status: Never Smoker     Smokeless tobacco: Never Used   Substance Use Topics     Alcohol use: Not Currently     If you drink alcohol do you typically have >3 drinks per day or >7 drinks per week? No    Alcohol Use 7/13/2021   Prescreen: >3 drinks/day or >7 drinks/week? Not Applicable   Prescreen: >3 drinks/day or >7 drinks/week? -     Last PSA: No results found for: PSA    Reviewed orders with patient. Reviewed health maintenance and updated orders accordingly - Yes    Reviewed and updated as needed this visit by clinical staff  Tobacco  Allergies  Meds  Problems  Med Hx  " Surg Hx  Fam Hx          Reviewed and updated as needed this visit by Provider  Tobacco  Allergies  Meds  Problems  Med Hx  Surg Hx  Fam Hx           Review of Systems   Constitutional: Negative for chills and fever.   HENT: Negative for congestion, ear pain, hearing loss and sore throat.    Eyes: Positive for pain. Negative for visual disturbance.   Respiratory: Negative for cough and shortness of breath.    Cardiovascular: Negative for chest pain, palpitations and peripheral edema.   Gastrointestinal: Negative for abdominal pain, constipation, diarrhea, heartburn, hematochezia and nausea.   Genitourinary: Negative for discharge, dysuria, frequency, genital sores, hematuria, impotence and urgency.   Musculoskeletal: Negative for arthralgias, joint swelling and myalgias.   Skin: Negative for rash.   Neurological: Negative for dizziness, weakness, headaches and paresthesias.   Psychiatric/Behavioral: Negative for mood changes. The patient is not nervous/anxious.      OBJECTIVE:   /84   Pulse 50   Temp 98.1  F (36.7  C) (Tympanic)   Ht 1.803 m (5' 11\")   Wt 78.5 kg (173 lb)   SpO2 98%   BMI 24.13 kg/m      Physical Exam  GENERAL: Alert and in no distress.  EYES: Conjunctivae/corneas clear. EOMs grossly intact  HENT: NC/AT, facies symmetric. Neck supple.  RESP: CTAB. No w/r/r.  CV: RRR, no m/r/g.  GI: NT, ND, without rebound or guarding, no CVA tenderness  MSK: No cyanosis, clubbing or edema noted bilaterally in upper and/or lower extremities  SKIN: No significant ulcers, lesions or rashes on the visualized portions of the skin  NEURO: Alert. Oriented. Gait normal. Sensation grossly WNL.  PSYCH: Linear thought process. Speech normal rate and volume. No tangential thoughts, hallucinations, or delusions.    Diagnostic Test Results: Labs reviewed in Epic    ASSESSMENT/PLAN:   1. Routine general medical examination at a health care facility  Reviewed PMH. Discussed healthcare maintenance issues, " "including cancer screenings (colonoscopies, PSA), relevant immunizations, and cardiac risk factor screenings such as for cholesterol, HTN, and DM.  - REVIEW OF HEALTH MAINTENANCE PROTOCOL ORDERS    2. Encounter for screening for lipid disorder  Fasting today.  - Lipid Profile    3. Encounter for screening for diabetes mellitus  Fasting today.  - Comprehensive metabolic panel    4. Encounter for hepatitis C screening test for low risk patient  One-time CDC-recommended test as part of comprehensive preventative care.  - Hepatitis C Screen Reflex to HCV RNA Quant and Genotype    5. Encounter for immunization  - TDAP VACCINE (Adacel, Boostrix)  [[id number]]    Patient has been advised of split billing requirements and indicates understanding: Yes     COUNSELING:   Special attention given to:        Regular exercise       Healthy diet/nutrition       Vision screening       Immunizations    Vaccinated for: TDAP      Estimated body mass index is 24.13 kg/m  as calculated from the following:    Height as of this encounter: 1.803 m (5' 11\").    Weight as of this encounter: 78.5 kg (173 lb).     He reports that he has never smoked. He has never used smokeless tobacco.    Counseling Resources:  ATP IV Guidelines  Pooled Cohorts Equation Calculator  FRAX Risk Assessment  ICSI Preventive Guidelines  Dietary Guidelines for Americans, 2010  USDA's MyPlate  ASA Prophylaxis  Lung CA Screening    Nitish Galvin MD  [facility]  "

## 2021-07-13 NOTE — PATIENT INSTRUCTIONS
- Sign-up for Skytree and I will send you a message on Skytree when I am able to look at the results of your tests from today

## 2021-07-14 LAB — HCV AB SERPL QL IA: NONREACTIVE

## 2021-07-15 ENCOUNTER — LAB (OUTPATIENT)
Dept: LAB | Facility: CLINIC | Age: 46
End: 2021-07-15
Payer: MEDICAID

## 2021-07-15 DIAGNOSIS — Z13.220 ENCOUNTER FOR SCREENING FOR LIPID DISORDER: ICD-10-CM

## 2021-07-15 DIAGNOSIS — Z13.1 ENCOUNTER FOR SCREENING FOR DIABETES MELLITUS: ICD-10-CM

## 2021-07-15 LAB
ALBUMIN SERPL-MCNC: 3.8 G/DL (ref 3.4–5)
ALP SERPL-CCNC: 56 U/L (ref 40–150)
ALT SERPL W P-5'-P-CCNC: 26 U/L (ref 0–70)
ANION GAP SERPL CALCULATED.3IONS-SCNC: 4 MMOL/L (ref 3–14)
AST SERPL W P-5'-P-CCNC: 18 U/L (ref 0–45)
BILIRUB SERPL-MCNC: 1.3 MG/DL (ref 0.2–1.3)
BUN SERPL-MCNC: 12 MG/DL (ref 7–30)
CALCIUM SERPL-MCNC: 8.5 MG/DL (ref 8.5–10.1)
CHLORIDE BLD-SCNC: 108 MMOL/L (ref 94–109)
CHOLEST SERPL-MCNC: 180 MG/DL
CO2 SERPL-SCNC: 29 MMOL/L (ref 20–32)
CREAT SERPL-MCNC: 1.09 MG/DL (ref 0.66–1.25)
FASTING STATUS PATIENT QL REPORTED: YES
GFR SERPL CREATININE-BSD FRML MDRD: 80 ML/MIN/1.73M2
GLUCOSE BLD-MCNC: 108 MG/DL (ref 70–99)
HDLC SERPL-MCNC: 58 MG/DL
LDLC SERPL CALC-MCNC: 93 MG/DL
NONHDLC SERPL-MCNC: 122 MG/DL
POTASSIUM BLD-SCNC: 3.5 MMOL/L (ref 3.4–5.3)
PROT SERPL-MCNC: 7.3 G/DL (ref 6.8–8.8)
SODIUM SERPL-SCNC: 141 MMOL/L (ref 133–144)
TRIGL SERPL-MCNC: 145 MG/DL

## 2021-07-15 PROCEDURE — 80061 LIPID PANEL: CPT

## 2021-07-15 PROCEDURE — 80053 COMPREHEN METABOLIC PANEL: CPT

## 2021-07-15 PROCEDURE — 36415 COLL VENOUS BLD VENIPUNCTURE: CPT

## 2021-10-08 ENCOUNTER — HOSPITAL ENCOUNTER (OUTPATIENT)
Dept: GENERAL RADIOLOGY | Facility: CLINIC | Age: 46
Discharge: HOME | End: 2021-10-08
Attending: NURSE PRACTITIONER | Admitting: NURSE PRACTITIONER
Payer: COMMERCIAL

## 2021-10-08 DIAGNOSIS — Z86.11 HISTORY OF TREATMENT FOR TUBERCULOSIS: ICD-10-CM

## 2021-10-08 PROCEDURE — 71046 X-RAY EXAM CHEST 2 VIEWS: CPT | Mod: 26 | Performed by: RADIOLOGY

## 2021-10-08 PROCEDURE — 71046 X-RAY EXAM CHEST 2 VIEWS: CPT

## 2021-10-17 ENCOUNTER — HEALTH MAINTENANCE LETTER (OUTPATIENT)
Age: 46
End: 2021-10-17

## 2021-10-22 ENCOUNTER — APPOINTMENT (OUTPATIENT)
Dept: GENERAL RADIOLOGY | Facility: CLINIC | Age: 46
End: 2021-10-22
Attending: EMERGENCY MEDICINE
Payer: COMMERCIAL

## 2021-10-22 ENCOUNTER — HOSPITAL ENCOUNTER (EMERGENCY)
Facility: CLINIC | Age: 46
Discharge: HOME | End: 2021-10-22
Attending: PHYSICIAN ASSISTANT | Admitting: PHYSICIAN ASSISTANT
Payer: COMMERCIAL

## 2021-10-22 VITALS
SYSTOLIC BLOOD PRESSURE: 136 MMHG | OXYGEN SATURATION: 99 % | BODY MASS INDEX: 22.82 KG/M2 | HEIGHT: 73 IN | DIASTOLIC BLOOD PRESSURE: 90 MMHG | HEART RATE: 76 BPM | RESPIRATION RATE: 18 BRPM | TEMPERATURE: 96.9 F

## 2021-10-22 DIAGNOSIS — S52.123A RADIAL HEAD FRACTURE: ICD-10-CM

## 2021-10-22 PROCEDURE — 99284 EMERGENCY DEPT VISIT MOD MDM: CPT | Mod: 25

## 2021-10-22 PROCEDURE — 73080 X-RAY EXAM OF ELBOW: CPT | Mod: LT

## 2021-10-22 PROCEDURE — 99283 EMERGENCY DEPT VISIT LOW MDM: CPT

## 2021-10-22 PROCEDURE — 24650 CLTX RDL HEAD/NCK FX WO MNPJ: CPT | Mod: LT

## 2021-10-22 ASSESSMENT — ENCOUNTER SYMPTOMS: ARTHRALGIAS: 1

## 2021-10-22 NOTE — ED PROVIDER NOTES
"  History   Chief Complaint:  Elbow Pain    The history is provided by the patient.      Cr Liu is a 46 year old male, otherwise healthy, who presents with left elbow pain beginning 1 week ago after he tripped and fell. At the time of his fall, the patient reportedly landed on his buttock and left elbow, with his buttock healing since and the pain to his elbow persisting into today. Concern over this prompted his presentation to the ED at this time. He is not on blood thinners. He denies any other injuries.  No numbness in hand or fingers.    Review of Systems   Musculoskeletal: Positive for arthralgias.   All other systems reviewed and are negative.    Allergies:  No known drug allergies    Medications:  History reviewed. No current medications on file.    Past Medical History:     History reviewed. No pertinent past medical history.      Social History:  The patient presented alone.  The patient arrived in a private vehicle.    Physical Exam     Patient Vitals for the past 24 hrs:   BP Temp Temp src Pulse Resp SpO2 Height   10/22/21 1453 (!) 136/90 96.9  F (36.1  C) Temporal 76 18 99 % 1.854 m (6' 1\")     Physical Exam  General: Alert and oriented.    Head: Normocephalic.  External ears and nose normal.    Eyes: Pupils equal and round.  Normal tracking.    Pulmonary/Chest: Effort and rate normal    MSK: Normal ROM in shoulder, elbow, wrist, fingers.  There is tenderness to palpation over the left radial head without bruising or or significant swelling.  No tenderness to palpation over the proximal humerus, clavicle, or AC joint, or at the wrist fingers or hand.    SKIN:  Warm and dry with strong radial pulse and normal capillary refill.    NEURO:  Normal strength of flexion and extension at MCP, PIP, and DIP joints.  Normal sensation through the radial/ulnar/median nerve distributions.    PSYCH:  Normal affect    Emergency Department Course     Imaging:    Elbow XR, G/E 3 Views, Left  Fracture of the " radial head with 1.5 mm of articular surface depression and 2 mm of articular surface distraction. There may be a tiny displaced comminuted fragments. Hemarthrosis.  Results Per Radiology    Emergency Department Course:    Reviewed:  I reviewed nursing notes, vitals and past medical history.    Assessments:  1531 I obtained history and examined the patient as noted above. I explained findings and believe that they are safe for discharge at this time.    Disposition:  The patient was discharged to home.     Impression & Plan     Medical Decision Makin year old male presents with L elbow pain after mechanical fall.  X-rays demonstrate evidence of radial head fx.  This was placed in sling.  CMS intact, no evidence of neurovascular compromise.  No indication for emergent reduction or orthopedic consultation from the ED.  Head to toe trauma exam otherwise non-concerning.  Discussed orthopedic follow-up and patient will call to schedule appointment for follow-up in 5-7 days.  Discussed RICE and pain control.  Strict return precautions given for signs of compartment syndrome or neurovascular compromise and these were provided in writing.    Diagnosis:    ICD-10-CM    1. Radial head fracture  S52.123A      Scribe Disclosure:  I, Stephanie Cat, am serving as a scribe at 3:27 PM on 10/22/2021 to document services personally performed by Roger Razo PA-C based on my observations and the provider's statements to me.       Roger Razo PA-C  10/22/21 7583

## 2021-10-22 NOTE — LETTER
October 22, 2021      To Whom It May Concern:      Cr Liu was seen in our Emergency Department today, 10/22/21.  His left arm will be in a sling for several weeks secondary to a fracture and he may have limited use of this arm.    Sincerely,        Roger Razo PA-C

## 2021-10-22 NOTE — ED PROVIDER NOTES
Emergency Department Attending Supervision Note  10/22/2021  3:44 PM      I evaluated this patient in conjunction with Roger Razo PA-C      Briefly, the patient presented with Left elbow pain after a fall.  No numbness.      On my exam, tenderness to the left radial head, no wounds. Distal CSM intact.    Results:    ED course:    My impression is Left radial head fracture, closed.        Diagnosis  (S52.123A) Radial head fracture  Comment: Left, closed          MD Sav Fox Cheri Lee, MD  10/22/21 1546       Verna Ang MD  10/22/21 1547

## 2021-11-08 ENCOUNTER — OFFICE VISIT (OUTPATIENT)
Dept: INTERNAL MEDICINE | Facility: CLINIC | Age: 46
End: 2021-11-08
Payer: OTHER MISCELLANEOUS

## 2021-11-08 VITALS
RESPIRATION RATE: 20 BRPM | WEIGHT: 177.1 LBS | DIASTOLIC BLOOD PRESSURE: 89 MMHG | SYSTOLIC BLOOD PRESSURE: 127 MMHG | OXYGEN SATURATION: 100 % | BODY MASS INDEX: 23.47 KG/M2 | TEMPERATURE: 97.4 F | HEART RATE: 64 BPM | HEIGHT: 73 IN

## 2021-11-08 DIAGNOSIS — Z01.818 PREOP GENERAL PHYSICAL EXAM: Primary | ICD-10-CM

## 2021-11-08 DIAGNOSIS — S42.402P: ICD-10-CM

## 2021-11-08 LAB
BASOPHILS # BLD AUTO: 0 10E3/UL (ref 0–0.2)
BASOPHILS NFR BLD AUTO: 0 %
EOSINOPHIL # BLD AUTO: 0.1 10E3/UL (ref 0–0.7)
EOSINOPHIL NFR BLD AUTO: 1 %
ERYTHROCYTE [DISTWIDTH] IN BLOOD BY AUTOMATED COUNT: 12.2 % (ref 10–15)
HCT VFR BLD AUTO: 41.8 % (ref 40–53)
HGB BLD-MCNC: 14.1 G/DL (ref 13.3–17.7)
LYMPHOCYTES # BLD AUTO: 2 10E3/UL (ref 0.8–5.3)
LYMPHOCYTES NFR BLD AUTO: 32 %
MCH RBC QN AUTO: 30 PG (ref 26.5–33)
MCHC RBC AUTO-ENTMCNC: 33.7 G/DL (ref 31.5–36.5)
MCV RBC AUTO: 89 FL (ref 78–100)
MONOCYTES # BLD AUTO: 0.6 10E3/UL (ref 0–1.3)
MONOCYTES NFR BLD AUTO: 11 %
NEUTROPHILS # BLD AUTO: 3.4 10E3/UL (ref 1.6–8.3)
NEUTROPHILS NFR BLD AUTO: 56 %
PLATELET # BLD AUTO: 203 10E3/UL (ref 150–450)
RBC # BLD AUTO: 4.7 10E6/UL (ref 4.4–5.9)
WBC # BLD AUTO: 6.1 10E3/UL (ref 4–11)

## 2021-11-08 PROCEDURE — 93000 ELECTROCARDIOGRAM COMPLETE: CPT | Performed by: NURSE PRACTITIONER

## 2021-11-08 PROCEDURE — 85025 COMPLETE CBC W/AUTO DIFF WBC: CPT | Performed by: NURSE PRACTITIONER

## 2021-11-08 PROCEDURE — 36415 COLL VENOUS BLD VENIPUNCTURE: CPT | Performed by: NURSE PRACTITIONER

## 2021-11-08 PROCEDURE — 80048 BASIC METABOLIC PNL TOTAL CA: CPT | Performed by: NURSE PRACTITIONER

## 2021-11-08 PROCEDURE — 99214 OFFICE O/P EST MOD 30 MIN: CPT | Performed by: NURSE PRACTITIONER

## 2021-11-08 RX ORDER — IBUPROFEN 200 MG
200 TABLET ORAL EVERY 4 HOURS PRN
COMMUNITY
End: 2023-06-29

## 2021-11-08 RX ORDER — ACETAMINOPHEN 325 MG/1
325-650 TABLET ORAL EVERY 6 HOURS PRN
COMMUNITY
End: 2023-06-29

## 2021-11-08 ASSESSMENT — MIFFLIN-ST. JEOR: SCORE: 1727.2

## 2021-11-08 NOTE — LETTER
[place] [address], [address]  [place] [address]  [phone]        November 10, 2021    Cr ACOSTA Famanatoly  [address] [facility] [address]            Dear Mr. Cr Liu:      The results of your recent labs were ok for surgery.  If you have any further questions or problems, please contact our office.    Sincerely,      Deborah South CNP    Results for orders placed or performed in visit on 11/08/21   Basic metabolic panel  (Ca, Cl, CO2, Creat, Gluc, K, Na, BUN)     Status: Normal   Result Value Ref Range    Sodium 138 133 - 144 mmol/L    Potassium 4.2 3.4 - 5.3 mmol/L    Chloride 106 94 - 109 mmol/L    Carbon Dioxide (CO2) 26 20 - 32 mmol/L    Anion Gap 6 3 - 14 mmol/L    Urea Nitrogen 13 7 - 30 mg/dL    Creatinine 0.91 0.66 - 1.25 mg/dL    Calcium 8.9 8.5 - 10.1 mg/dL    Glucose 81 70 - 99 mg/dL    GFR Estimate >90 >60 mL/min/1.73m2   CBC with platelets and differential     Status: None   Result Value Ref Range    WBC Count 6.1 4.0 - 11.0 10e3/uL    RBC Count 4.70 4.40 - 5.90 10e6/uL    Hemoglobin 14.1 13.3 - 17.7 g/dL    Hematocrit 41.8 40.0 - 53.0 %    MCV 89 78 - 100 fL    MCH 30.0 26.5 - 33.0 pg    MCHC 33.7 31.5 - 36.5 g/dL    RDW 12.2 10.0 - 15.0 %    Platelet Count 203 150 - 450 10e3/uL    % Neutrophils 56 %    % Lymphocytes 32 %    % Monocytes 11 %    % Eosinophils 1 %    % Basophils 0 %    Absolute Neutrophils 3.4 1.6 - 8.3 10e3/uL    Absolute Lymphocytes 2.0 0.8 - 5.3 10e3/uL    Absolute Monocytes 0.6 0.0 - 1.3 10e3/uL    Absolute Eosinophils 0.1 0.0 - 0.7 10e3/uL    Absolute Basophils 0.0 0.0 - 0.2 10e3/uL   CBC with platelets and differential     Status: None    Narrative    The following orders were created for panel order CBC with platelets and differential.  Procedure                               Abnormality         Status                     ---------                               -----------         ------                      CBC with platelets and d...[[id number]]                      Final result                 Please view results for these tests on the individual orders.

## 2021-11-08 NOTE — PROGRESS NOTES
[place] [address]  [facility] [address]  Phone: [phone]  Primary Provider: Herman Waddell  Pre-op Performing Provider: SURI RAMON      PREOPERATIVE EVALUATION:  Today's date: 11/8/2021    Cr Liu is a 46 year old male who presents for a preoperative evaluation.    Surgical Information:  Surgery/Procedure: Left elbow surgery  Surgery Location: [facility]  Surgeon: Dr. Terrance Hodgson  Surgery Date: 11/9/21  Time of Surgery: 10:45 AM  Where patient plans to recover: At home with family  Fax number for surgical facility: [phone]    Type of Anesthesia Anticipated: to be determined    Assessment & Plan     The proposed surgical procedure is considered LOW risk.    Preop general physical exam  - EKG 12-lead complete w/read - Clinics: sinus bradycardia but no acute changes  - Basic metabolic panel  (Ca, Cl, CO2, Creat, Gluc, K, Na, BUN); Future  - CBC with platelets and differential; Future  - Basic metabolic panel  (Ca, Cl, CO2, Creat, Gluc, K, Na, BUN)  - CBC with platelets and differential    Closed fracture of left elbow with malunion, subsequent encounter  - scheduled for surgery on 11/9/2021           Risks and Recommendations:  The patient has the following additional risks and recommendations for perioperative complications:   - No identified additional risk factors other than previously addressed    Medication Instructions:  Patient is on no chronic medications   STOP Ibuprofen and use Tylenol for pain    RECOMMENDATION:  APPROVAL GIVEN to proceed with proposed procedure, without further diagnostic evaluation.  [id number]}    Subjective     HPI related to upcoming procedure: tripped and fell 10/16/2021 and seen in ER and found to have a fractured left radial head close to the elbow; plan to remove bone chips on 11/9/2021    Preop Questions 11/8/2021   1. Have you ever had a heart attack or stroke? No   2. Have you ever  had surgery on your heart or blood vessels, such as a stent placement, a coronary artery bypass, or surgery on an artery in your head, neck, heart, or legs? No   3. Do you have chest pain with activity? No   4. Do you have a history of  heart failure? No   5. Do you currently have a cold, bronchitis or symptoms of other infection? No   6. Do you have a cough, shortness of breath, or wheezing? No   7. Do you or anyone in your family have previous history of blood clots? No   8. Do you or does anyone in your family have a serious bleeding problem such as prolonged bleeding following surgeries or cuts? No   9. Have you ever had problems with anemia or been told to take iron pills? No   10. Have you had any abnormal blood loss such as black, tarry or bloody stools? No   11. Have you ever had a blood transfusion? No   12. Are you willing to have a blood transfusion if it is medically needed before, during, or after your surgery? Yes   13. Have you or any of your relatives ever had problems with anesthesia? UNKNOWN - but does not think so by family but none by self   14. Do you have sleep apnea, excessive snoring or daytime drowsiness? No   15. Do you have any artifical heart valves or other implanted medical devices like a pacemaker, defibrillator, or continuous glucose monitor? No   16. Do you have artificial joints? No   17. Are you allergic to latex? No       Health Care Directive:  Patient does not have a Health Care Directive or Living Will: Discussed advance care planning with patient; however, patient declined at this time.    Preoperative Review of :   reviewed - no record of controlled substances prescribed.      Status of Chronic Conditions:  No chronic problems    Review of Systems  CONSTITUTIONAL: NEGATIVE for fever, chills, change in weight  INTEGUMENTARY/SKIN: NEGATIVE for worrisome rashes, moles or lesions  EYES: NEGATIVE for vision changes or irritation  ENT/MOUTH: NEGATIVE for ear, mouth and throat  "problems  RESP: NEGATIVE for significant cough or SOB  CV: NEGATIVE for chest pain, palpitations or peripheral edema  GI: NEGATIVE for nausea, abdominal pain, heartburn, or change in bowel habits  : NEGATIVE for frequency, dysuria, or hematuria  MUSCULOSKELETAL: NEGATIVE for significant arthralgias or myalgia  NEURO: NEGATIVE for weakness, dizziness or paresthesias  ENDOCRINE: NEGATIVE for temperature intolerance, skin/hair changes  HEME: NEGATIVE for bleeding problems  PSYCHIATRIC: NEGATIVE for changes in mood or affect    There are no problems to display for this patient.     History reviewed. No pertinent past medical history.  History reviewed. No pertinent surgical history.  Current Outpatient Medications   Medication Sig Dispense Refill     acetaminophen (TYLENOL) 325 MG tablet Take 325-650 mg by mouth every 6 hours as needed for mild pain       ibuprofen (ADVIL/MOTRIN) 200 MG tablet Take 200 mg by mouth every 4 hours as needed for mild pain         No Known Allergies     Social History     Tobacco Use     Smoking status: Never Smoker     Smokeless tobacco: Never Used   Substance Use Topics     Alcohol use: Not Currently     Family History   Problem Relation Age of Onset     No Known Problems Mother      No Known Problems Father      History   Drug Use Unknown         Objective     /89 (BP Location: Right arm, Patient Position: Sitting)   Pulse 64   Temp 97.4  F (36.3  C) (Tympanic)   Resp 20   Ht 1.854 m (6' 1\")   Wt 80.3 kg (177 lb 1.6 oz)   SpO2 100%   BMI 23.37 kg/m      Physical Exam    GENERAL APPEARANCE: alert and no distress     EYES: EOMI,  PERRL     HENT: ear canals and TM's normal and nose and mouth without ulcers or lesions     NECK: no adenopathy, no asymmetry, masses, or scars and thyroid normal to palpation     RESP: lungs clear to auscultation - no rales, rhonchi or wheezes     CV: regular rates and rhythm, normal S1 S2, no S3 or S4 and no murmur, click or rub     ABDOMEN:  soft, " nontender, no HSM or masses and bowel sounds normal     MS: extremities normal- no gross deformities noted, no evidence of inflammation in joints, FROM in all extremities; left elbow unable to fully extend it with some mild swelling noted.     SKIN: no suspicious lesions or rashes     NEURO: Normal strength and tone, sensory exam grossly normal, mentation intact and speech normal     PSYCH: mentation appears normal. and affect normal/bright     LYMPHATICS: No cervical adenopathy    Recent Labs   Lab Test 07/15/21  1058      POTASSIUM 3.5   CR 1.09        Diagnostics:  Labs pending at this time (BMP and CBC).  Results will be reviewed when available.   EKG: appears normal, NSR, sinus bradycardia, normal axis, normal intervals, no acute ST/T changes c/w ischemia, no LVH by voltage criteria, there are no prior tracings available    Revised Cardiac Risk Index (RCRI):  The patient has the following serious cardiovascular risks for perioperative complications:   - No serious cardiac risks = 0 points     RCRI Interpretation: 0 points: Class I (very low risk - 0.4% complication rate)           Signed Electronically by: Deborah South CNP  Copy of this evaluation report is provided to requesting physician.

## 2021-11-08 NOTE — PATIENT INSTRUCTIONS
Cleared for surgery     Go to [address] for labs    EKG essentially ok    When feeling better, call and make a nurse visit for flu shot      Preparing for Your Surgery  Getting started  A nurse will call you to review your health history and instructions. They will give you an arrival time based on your scheduled surgery time.  Please be ready to share the following:    Your doctor's clinic name and phone number    Your medical, surgical and anesthesia history    A list of allergies and sensitivities    A list of medicines, including herbal treatments and over-the-counter drugs    Whether the patient has a legal guardian (ask how to send us the papers in advance)  If you have a child who's having surgery, please ask for a copy of Preparing for Your Child's Surgery.    Preparing for surgery    Within 30 days of surgery: Have a pre-op exam (sometimes called an H&P, or History and Physical). This can be done at a clinic or pre-operative center.  ? If you're having a , you may not need this exam. Talk to your care team    At your pre-op exam, talk to your care team about all medicines you take. If you need to stop any medicines before surgery, ask when to start taking them again.  ? We do this for your safety. Many medicines can make you bleed too much during surgery. Some change how well surgery (anesthesia) drugs work.    Call your insurance company to let them know you're having surgery. (If you don't have insurance, call [phone].)    Call your clinic if there's any change in your health. This includes signs of a cold or flu (sore throat, runny nose, cough, rash, fever). It also includes a scrape or scratch near the surgery site.    If you have questions on the day of surgery, call your hospital or surgery center.  Eating and drinking guidelines  For your safety: Unless your surgeon tells you otherwise, follow the guidelines below.    Eat and drink as usual until 8 hours before surgery. After that, no  food or milk.    Drink clear liquids until 2 hours before surgery. These are liquids you can see through, like water, Gatorade and Propel Water. You may also have black coffee and tea (no cream or milk).    Nothing by mouth within 2 hours of surgery. This includes gum, candy and breath mints.    If you drink, stop drinking alcohol the night before surgery.    If your care team tells you to take medicine on the morning of surgery, it's okay to take it with a sip of water.  Preventing infection    Shower or bathe the night before and morning of your surgery. Follow the instructions your clinic gave you. (If no instructions, use regular soap.)    Don't shave or clip hair near your surgery site. We'll remove the hair if needed.    Don't smoke or vape the morning of surgery. You may chew nicotine gum up to 2 hours before surgery. A nicotine patch is okay.  ? Note: Some surgeries require you to completely quit smoking and nicotine. Check with your surgeon.    Your care team will make every effort to keep you safe from infection. We will:  ? Clean our hands often with soap and water (or an alcohol-based hand rub).  ? Clean the skin at your surgery site with a special soap that kills germs.  ? Give you a special gown to keep you warm. (Cold raises the risk of infection.)  ? Wear special hair covers, masks, gowns and gloves during surgery.  ? Give antibiotic medicine, if prescribed. Not all surgeries need antibiotics.  What to bring on the day of surgery    Photo ID and insurance card    Copy of your health care directive, if you have one    Glasses and hearing aides (bring cases)  ? You can't wear contacts during surgery    Inhaler and eye drops, if you use them (tell us about these when you arrive)    CPAP machine or breathing device, if you use them    A few personal items, if spending the night    If you have . . .  ? A pacemaker or ICD (cardiac defibrillator): Bring the ID card.  ? An implanted stimulator: Bring the  remote control.  ? A legal guardian: Bring a copy of the certified (court-stamped) guardianship papers.  Please remove any jewelry, including body piercings. Leave jewelry and other valuables at home.  If you're going home the day of surgery  Important: If you don't follow the rules below, we must cancel your surgery.     Arrange for someone to drive you home after surgery. You may not drive, take a taxi or take public transportation by yourself (unless you'll have local anesthesia only).    Arrange for a responsible adult to stay with you overnight. If you don't, we may keep you in the hospital overnight, and you may need to pay the costs yourself.  Questions?   If you have any questions for your care team, list them here: _________________________________________________________________________________________________________________________________________________________________________________________________________________________________________________________________________________________________________________________  For informational purposes only. Not to replace the advice of your health care provider. Copyright   2003, 2019 [facility]. All rights reserved. Clinically reviewed by Justina Rainey MD. [employer] [id number] - REV 4/20.

## 2021-11-09 LAB
ANION GAP SERPL CALCULATED.3IONS-SCNC: 6 MMOL/L (ref 3–14)
BUN SERPL-MCNC: 13 MG/DL (ref 7–30)
CALCIUM SERPL-MCNC: 8.9 MG/DL (ref 8.5–10.1)
CHLORIDE BLD-SCNC: 106 MMOL/L (ref 94–109)
CO2 SERPL-SCNC: 26 MMOL/L (ref 20–32)
CREAT SERPL-MCNC: 0.91 MG/DL (ref 0.66–1.25)
GFR SERPL CREATININE-BSD FRML MDRD: >90 ML/MIN/1.73M2
GLUCOSE BLD-MCNC: 81 MG/DL (ref 70–99)
POTASSIUM BLD-SCNC: 4.2 MMOL/L (ref 3.4–5.3)
SODIUM SERPL-SCNC: 138 MMOL/L (ref 133–144)

## 2021-11-10 NOTE — NURSING NOTE
Today's pre-op notes, EKG & lab results faxed to Dr. Terrance Hodgson at [facility] in [place] at [phone].

## 2022-10-03 ENCOUNTER — HEALTH MAINTENANCE LETTER (OUTPATIENT)
Age: 47
End: 2022-10-03

## 2023-01-30 ENCOUNTER — OFFICE VISIT (OUTPATIENT)
Dept: DERMATOLOGY | Facility: CLINIC | Age: 48
End: 2023-01-30
Payer: COMMERCIAL

## 2023-01-30 ENCOUNTER — TELEPHONE (OUTPATIENT)
Dept: DERMATOLOGY | Facility: CLINIC | Age: 48
End: 2023-01-30

## 2023-01-30 DIAGNOSIS — R20.2 PAINFUL PARESTHESIA: Primary | ICD-10-CM

## 2023-01-30 DIAGNOSIS — L85.3 XEROSIS OF SKIN: ICD-10-CM

## 2023-01-30 DIAGNOSIS — R52 PAINFUL PARESTHESIA: Primary | ICD-10-CM

## 2023-01-30 PROCEDURE — 99205 OFFICE O/P NEW HI 60 MIN: CPT | Performed by: STUDENT IN AN ORGANIZED HEALTH CARE EDUCATION/TRAINING PROGRAM

## 2023-01-30 RX ORDER — PREGABALIN 25 MG/1
CAPSULE ORAL
Qty: 120 CAPSULE | Refills: 1 | Status: SHIPPED | OUTPATIENT
Start: 2023-01-30 | End: 2023-06-29

## 2023-01-30 RX ORDER — PREGABALIN 25 MG/1
CAPSULE ORAL
Qty: 180 CAPSULE | Refills: 1 | Status: SHIPPED | OUTPATIENT
Start: 2023-01-30 | End: 2023-01-30

## 2023-01-30 NOTE — PROGRESS NOTES
Provider Time: New 71minutes spent on the date of the encounter doing chart review, patient visit (including history, exam, and counseling), and documentation, excluding any procedures performed.       [facility] Dermatology Note    Encounter Date: Jan 30, 2023    Dermatology Problem List:  -   ______________________________________    Impression/Plan:  Cr was seen today for derm problem.    Diagnoses and all orders for this visit:    Painful paresthesia  -     pregabalin (LYRICA) 25 MG capsule; Take 3 pills twice daily  -     Vitamin B12; Future  -     Hemoglobin A1c; Future  -     Protein electrophoresis; Future    - For over a year patient describes sensation of painful burning whenever the skin of his back is in contact with surface including chairs in bed  - Has trialed over-the-counter emollients to treat dry skin without any improvement whatsoever  - No visible surface change or erythema that might suggest inflammation within the skin on exam  - We discussed the possibility that the sensations that he is having are not due to skin inflammation but rather aberrant nerve signals, will trial empiric pregabalin starting at 25 mg daily and increasing to a maximum of 3 pills twice daily (since starting this note pharmacy has called back and said that patient's insurance will only cover 2 pills twice daily prescription changed to this)  -Check vitamin B12 hemoglobin A1c and protein electrophoresis in the unlikely event that these could uncover a cause of small fiber neuropathy    Follow-up in 1 mo.       Staff Involved:  Staff Only    Jose Daniel Grider MD   of Dermatology  Department of Dermatology  [facility]      CC:   Chief Complaint   Patient presents with     Derm Problem     2 months, burning sensation on the lower back, denies pain, discomfort, pus, drainage, red bumps, pt states that every time something touches his skin it burns,  tried using creams and shows no improvement        History of Present Illness:  Mr. Cr Liu is a 48 year old male who presents as a new patient.    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 Now whenever his back presses against surfaces it burns.  Has tried treating his dry skin with things like Aquaphor and Vaseline but this is not improved the sensations wondering whether he could have damaged his skin with the seat heater    Labs:      Physical exam:  Vitals: There were no vitals taken for this visit.  GEN: well developed, well-nourished, in no acute distress, in a pleasant mood.     SKIN: Lopes phototype 5  - Waist-up skin, which includes the head/face, neck, both arms, chest, back, abdomen, digits and/or nails was examined.  - diffuse dry skin on trunk and extremities  - No other lesions of concern on areas examined.     Past Medical History:   Past Medical History:   Diagnosis Date     Closed displaced fracture of head of left radius with malunion 10/22/2021    s/p fall     No past surgical history on file.    Social History:   reports that he has never smoked. He has never used smokeless tobacco. He reports that he does not currently use alcohol. He reports that he does not currently use drugs.    Family History:  Family History   Problem Relation Age of Onset     No Known Problems Mother      No Known Problems Father        Medications:  Current Outpatient Medications   Medication Sig Dispense Refill     pregabalin (LYRICA) 25 MG capsule Take 3 pills twice daily 180 capsule 1     acetaminophen (TYLENOL) 325 MG tablet Take 325-650 mg by mouth every 6 hours as needed for mild pain (Patient not taking: Reported on 1/30/2023)       ibuprofen (ADVIL/MOTRIN) 200 MG tablet Take 200 mg by mouth every 4 hours as needed for mild pain (Patient not taking: Reported on 1/30/2023)       No Known Allergies

## 2023-01-30 NOTE — TELEPHONE ENCOUNTER
Closing encounter as Dr. Grider sent in new order.    Thank you,  Marychuy MOYA RN  Dermatology   [phone]

## 2023-01-30 NOTE — LETTER
1/30/2023         RE: Cr Liu  [address] [facility] [address]        Dear Colleague,    Thank you for referring your patient, Cr Liu, to the [facility]. Please see a copy of my visit note below.    Provider Time: New 71minutes spent on the date of the encounter doing chart review, patient visit (including history, exam, and counseling), and documentation, excluding any procedures performed.       [facility] Dermatology Note    Encounter Date: Jan 30, 2023    Dermatology Problem List:  -   ______________________________________    Impression/Plan:  Cr was seen today for derm problem.    Diagnoses and all orders for this visit:    Painful paresthesia  -     pregabalin (LYRICA) 25 MG capsule; Take 3 pills twice daily  -     Vitamin B12; Future  -     Hemoglobin A1c; Future  -     Protein electrophoresis; Future    - For over a year patient describes sensation of painful burning whenever the skin of his back is in contact with surface including chairs in bed  - Has trialed over-the-counter emollients to treat dry skin without any improvement whatsoever  - No visible surface change or erythema that might suggest inflammation within the skin on exam  - We discussed the possibility that the sensations that he is having are not due to skin inflammation but rather aberrant nerve signals, will trial empiric pregabalin starting at 25 mg daily and increasing to a maximum of 3 pills twice daily (since starting this note pharmacy has called back and said that patient's insurance will only cover 2 pills twice daily prescription changed to this)  -Check vitamin B12 hemoglobin A1c and protein electrophoresis in the unlikely event that these could uncover a cause of small fiber neuropathy    Follow-up in 1 mo.       Staff Involved:  Staff Only    Jose Daniel Grider MD   of Dermatology  Department of Dermatology   [facility]      CC:   Chief Complaint   Patient presents with     Derm Problem     2 months, burning sensation on the lower back, denies pain, discomfort, pus, drainage, red bumps, pt states that every time something touches his skin it burns, tried using creams and shows no improvement        History of Present Illness:  Mr. Cr Liu is a 48 year old male who presents as a new patient.    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 Now whenever his back presses against surfaces it burns.  Has tried treating his dry skin with things like Aquaphor and Vaseline but this is not improved the sensations wondering whether he could have damaged his skin with the seat heater    Labs:      Physical exam:  Vitals: There were no vitals taken for this visit.  GEN: well developed, well-nourished, in no acute distress, in a pleasant mood.     SKIN: Lopes phototype 5  - Waist-up skin, which includes the head/face, neck, both arms, chest, back, abdomen, digits and/or nails was examined.  - diffuse dry skin on trunk and extremities  - No other lesions of concern on areas examined.     Past Medical History:   Past Medical History:   Diagnosis Date     Closed displaced fracture of head of left radius with malunion 10/22/2021    s/p fall     No past surgical history on file.    Social History:   reports that he has never smoked. He has never used smokeless tobacco. He reports that he does not currently use alcohol. He reports that he does not currently use drugs.    Family History:  Family History   Problem Relation Age of Onset     No Known Problems Mother      No Known Problems Father        Medications:  Current Outpatient Medications   Medication Sig Dispense Refill     pregabalin (LYRICA) 25 MG capsule Take 3 pills twice daily 180 capsule 1     acetaminophen (TYLENOL) 325 MG tablet Take 325-650 mg by mouth every 6 hours as needed for mild pain (Patient not taking:  Reported on 1/30/2023)       ibuprofen (ADVIL/MOTRIN) 200 MG tablet Take 200 mg by mouth every 4 hours as needed for mild pain (Patient not taking: Reported on 1/30/2023)       No Known Allergies                Again, thank you for allowing me to participate in the care of your patient.        Sincerely,        Jose Daniel Grider MD

## 2023-01-30 NOTE — TELEPHONE ENCOUNTER
[facility] Call Center    Phone Message    May a detailed message be left on voicemail: yes     Reason for Call: Medication Question or concern regarding medication   Prescription Clarification  Name of Medication:   pregabalin (LYRICA) 25 MG capsule  Prescribing Provider: Dr. JOSE Grider   Pharmacy: [facility] [address]   What on the order needs clarification? Instructions needed to be changed in order for insurance to cover it. Please call pharmacy to discuss. Thanks!       Action Taken: Message routed to:  [facility] ([facility] DERM    Travel Screening: Not Applicable

## 2023-02-22 ENCOUNTER — TRANSCRIBE ORDERS (OUTPATIENT)
Dept: OTHER | Age: 48
End: 2023-02-22

## 2023-02-22 DIAGNOSIS — Z12.11 SCREEN FOR COLON CANCER: Primary | ICD-10-CM

## 2023-02-23 ENCOUNTER — HOSPITAL ENCOUNTER (OUTPATIENT)
Facility: AMBULATORY SURGERY CENTER | Age: 48
End: 2023-02-23
Attending: INTERNAL MEDICINE
Payer: COMMERCIAL

## 2023-02-23 ENCOUNTER — TELEPHONE (OUTPATIENT)
Dept: GASTROENTEROLOGY | Facility: CLINIC | Age: 48
End: 2023-02-23
Payer: COMMERCIAL

## 2023-02-23 NOTE — TELEPHONE ENCOUNTER
Screening Questions  BLUE  KIND OF PREP RED  LOCATION [review exclusion criteria] GREEN  SEDATION TYPE    N  Are you active on mychart?   Shahana Lira  Ordering/Referring Provider?    MEDICAID  What type of coverage do you have?  N Have you had a positive covid test in the last 14 days?    23.0  1. BMI  [BMI 40+ - review exclusion criteria - MAC + [facility]]            *NEED PAC APPT AT [facility] + MAC (ONLY)*     SELF   2. Are you able to give consent for your medical care? [IF NO,RN REVIEW]          N  3. Are you taking any prescription pain medications on a routine schedule   (ex narcotics: tramadol, oxycodone, roxicodone, oxycontin,  and percocet)?    [RN Review]             N  3a. EXTENDED PREP What kind of prescription?     N 4. Do you have any chemical dependencies such as alcohol, street drugs, or methadone?    **If yes 3- 5 , please schedule with MAC sedation.**    Additional comments:  DROPPED CALL

## 2023-02-23 NOTE — TELEPHONE ENCOUNTER
Screening Questions  BLUE  KIND OF PREP RED  LOCATION [review exclusion criteria] GREEN  SEDATION TYPE    N  Are you active on mychart?   Shahana Lira  Ordering/Referring Provider?    MEDICAID  What type of coverage do you have?  N Have you had a positive covid test in the last 14 days?    23.0  1. BMI  [BMI 40+ - review exclusion criteria - MAC + [facility]]            *NEED PAC APPT AT [facility] + [facility] (ONLY)*     SELF   2. Are you able to give consent for your medical care? [IF NO,RN REVIEW]          N  3. Are you taking any prescription pain medications on a routine schedule   (ex narcotics: tramadol, oxycodone, roxicodone, oxycontin,  and percocet)?    [RN Review]             N  3a. EXTENDED PREP What kind of prescription?     N 4. Do you have any chemical dependencies such as alcohol, street drugs, or methadone?    **If yes 3- 5 , please schedule with MAC sedation.**     IF YES TO ANY 6 - 10 - HOSPITAL SETTING ONLY.     N 6.   Do you need assistance transferring?     N 7.   Have you had a heart or lung transplant?    N 8.   Are you currently on dialysis?   N 9.   Do you use daily home oxygen?   N 10. Do you take nitroglycerin?   10a.  If yes, how often?     11. [FEMALES]   Are you currently pregnant?    11a.  If yes, how many weeks? [ Greater than 12 weeks, OR NEEDED]    N 12. Do you have Pulmonary Hypertension? *NEED PAC APPT AT [facility] w/ MAC*     N 13. [review exclusion criteria]  Do you have any implantable devices in your body (pacemaker, defib, LVAD)?    N 14. In the past 6 months, have you had any heart related issues including cardiomyopathy or heart attack?     14a.  If yes, did it require cardiac stenting if so when?     N 15. Have you had a stroke or Transient ischemic attack (TIA - aka  mini stroke ) within 6 months?      N 16. Do you have mod to severe Obstructive Sleep Apnea?  [Hospital only]    N 17. Do you have SEVERE AND UNCONTROLLED asthma? *NEED PAC APPT AT [facility] w/MAC*     N 18. Are you currently taking any  "blood thinners?     18a. If yes, inform patient to \"follow up w/ ordering provider for bridging instructions.\"    N 19. Do you take the medication Phentermine?    19a. If yes, \"Hold for 7 days before procedure.  Please consult your prescribing provider if you have questions about holding this medication.\"     N  20. Do you have chronic kidney disease?      Y  21. Do you have a diagnosis of diabetes?     N  22. On a regular basis do you go 3-5 days between bowel movements?      23. Preferred LOCAL Pharmacy for Pre Prescription    [ LIST ONLY ONE PHARMACY]        [place], [place] - [address]          - CLOSING REMINDERS -    Informed patient they will need an adult    Cannot take any type of public or medical transportation alone    Conscious Sedation- Needs  for 6 hours after the procedure       MAC/General-Needs  for 24 hours after procedure    Pre-Procedure Covid test to be completed [[facility] PCR Testing Required]    Confirmed Nurse will call to complete assessment       - SCHEDULING DETAILS -  NO Hospital Setting Required? If yes, what is the exclusion?:    QUIANA PAT  Surgeon    5/12  Date of Procedure  Lower Endoscopy [Colonoscopy]  Type of Procedure Scheduled  [facility]-If you answer yes to questions #8, #20, #21Which Colonoscopy Prep was Sent?     MAC Sedation Type     NO PAC / Pre-op Required                 "

## 2023-04-27 ENCOUNTER — TELEPHONE (OUTPATIENT)
Dept: GASTROENTEROLOGY | Facility: CLINIC | Age: 48
End: 2023-04-27

## 2023-04-27 DIAGNOSIS — Z12.11 SPECIAL SCREENING FOR MALIGNANT NEOPLASMS, COLON: Primary | ICD-10-CM

## 2023-04-27 RX ORDER — BISACODYL 5 MG/1
TABLET, DELAYED RELEASE ORAL
Qty: 4 TABLET | Refills: 0 | Status: SHIPPED | OUTPATIENT
Start: 2023-04-27

## 2023-04-27 NOTE — TELEPHONE ENCOUNTER
Was sent for anesthesia review d/t difficult airway    Kenneth Reddy APRN CRNA Faust, Jennifer L, RN; Dawit Ybarra MD  Chart Reviewed.  Ok for Colonoscopy at the [facility],     Kenneth Reddy CRNA       Attempted to contact patient regarding upcoming Colonoscopy  procedure on 5/12/23 for pre assessment questions. No answer.     [facility]  ID [id number]    Unable to leave message, phone just kept ringing.    Discuss Covid policy and designated  policy.    Pre op exam? N/A    Arrival time: 0830. Procedure time: 0930    Facility location: [facility]; [address] [place] [address], [address] Floor, [place] [address]    Sedation type: MAC    Anticoagulants: No    Electronic implanted devices? No    Diabetic? Yes - Patient to hold oral diabetic medications day of procedure    Indication for procedure: screening colonoscopy    Bowel prep recommendation: Standard Golytely      Prep instructions sent via letter. Bowel prep script sent to     [place], [place] - [address]      Jennifer Lowry RN  Endoscopy Procedure Pre Assessment RN

## 2023-04-27 NOTE — LETTER
April 27, 2023      Maurice Yu  [address]  [id number]  [facility] [address]              Dear Maurice,          Colonoscopy      Procedure date: 5/12/23    Anticipated arrival time: 0830 AM   (Procedure Times are Subject to Change)    Facility location: [facility]; [address], 5th Floor, [place] [address] - Check in location: 5th Floor. Parking information: Paid self-parking is available in West surface lot directly across from the  main entrance of the [facility]. Entry and exit to this lot is on [facility]. In  addition, paid parking is also available in [place] [address] ([address]).  We have dedicated patient only spots on 1st floor of [facility].  services are available for patients with limited mobility. Services available Monday-Friday, 7:00a.m.-  5:00p.m.    Prep Instructions: Instructions on how to prepare for your upcoming procedure are found below. Deviation from instructions may result in less than desired outcomes and procedure may need to be rescheduled.      Policy: Please arrive with an adult who can drive you home after the exam and stay with you for the next 24 hours, unless your provider says otherwise. The  will need to be confirmed prior to the start of your procedure.     The medicines used in the exam will make you sleepy. You will not be able to drive. You cannot take public transportation,  ride share services, or non-medical taxi service without a responsible caregiver.  Medical transport services are allowed with the requirement that a responsible caregiver will receive you at your destination.  We will require confirmation of availability from caregiver prior to procedure.      If you are taking blood thinning medication: Medications such as Coumadin, Plavix, Rivaroxaban (Xarelto)..etc, may need to be temporarily stopped for multiple days before your scheduled procedure. Talk to your doctor. Your prescribing doctor will give  you directions to see if these medications should be changed or stopped prior to your exam. Procedure may be canceled if medications are not accurately held.     If you have Diabetes: Ask to have your exam early in the morning if possible. Call your doctor if you have questions regarding your diet modifications and/or diabetic medications during prep.       If you take Phentermine (Adipex-P, Lomaira): This MUST be held 7 days prior to your scheduled procedure. Your procedure will be canceled if this medication has not been held.     To reschedule or cancel your procedure: Please call our scheduling team at:   [facility]: [phone], option 2  Monday through Friday, 8 a.m. to 4:30 p.m.    ---------------------------------------------------------------------------------------------------------------------    Simone Neri (Colyte, Nulytely)  Dorina dexterarisaditi capellanamadka      Bowel prep has been sent to [place], [place] - [address]    Colonoscopy waa baaritatiti sosao isticmaalo in [facility] xiidmaha ryanyn. Tuubo jilicsan oo la yidhaahdo  scope  ayaa si tartiib ah loo galinayaa malawadkaaga ka dibna [facility] sii hagayaa xiidmahaaga weyn si loo arko gudaha xiidmahaaga. Inta lawado baadhitaanka, waxaa laga yaabaa in  takJ.[facility] Hospitalaagu: Ka colby saaro ama ka colby gooyo sambal ama qayb claudine oo ah unugyo iyo dheecaan oo loo yaqaanno afka qalaad (biopsy). Hadii loo baahdo ama lagu arko buro (polyps), waa uu ka colby goyn karaa. Taa waxa loo diraa sheybaarka.    Qof weyn oo qoyska ka tirsan ama saaxiib ayaa loo baahan yahay iney ku colby raacaan si ay guriga kuugu kaxeeyaan baaritaanka ka carlos. Ammaan kuuma ahan in aad baabuur waddo ama aad keligaa iska tagto. Waqtiga lagula balamo u imow baaritaankaaga. Ku talagal joogitaan ah 2 ilaa 3 saac, si loo helo wakhti laguugu diyaariyo baadhitaanka iyo wakhti aad ku raysatid ama ku colby fiicnaatid ka carlos baadhitaanka.      Si aad isugu diyaariso:   Kalkaaliso ayaa ku colby wici doonta usbuuc gudihiis laga bilaabo baaritaankaaga si ay ugu qorto udiyaarinta mindhicirka, ama ay carlos u eegis ku sameyso tilmaamaha diyaarinta, kaagana jawaabto chicas'aalaha kale ee aad qabtid.  Waa inaad diyaarsataa qof weyn inuu guriga kuu kaxeeyo ka carlos baaritaankaaga. colonoscopy-gaga lama samayn milka ilaa aad raacdid talooyikaas mooyee. Haddii aad u baahan tahay inaad isticmaasho gaadiidka dadweynaha, waa inuu qof ku colby raacaa oo juliet joogaa, ugu yaraan 6-24 saacadood.  Is Ka hubi shirkaddaada caymiska si aad u ogaatid inay dabooli doonaan imtixaankan.    Todoba maalmood ka hor baaritaanka:  La hadal dhakhtarkaaga:  Haddii aad qaadato dhiig-khafiifiye (sida Coumadin, Plavix, Xarelto), dawadaada ama jadwalkaaga ayaa laga yaabaa in ay u baahdaan in la beddelo baaritaanka ka hor.  Jooji qaadashada dheellitirka fiber-ka, fiitamiinnada badan ee birta leh, iyo daawooyinka ay ku jirto birta.  Jooji cunista galleyda, lawska iyo cuntooyinka leh iniinaha. Kuwasi waxay ku shannan karaan xiidmaaha maalmo.  Haddii aad qabto sonkorow:   Weydiiso in subaxda hore lagu baaro. Sidoo kale, weydii dhakhtarkaaga haddii ay tahay inaad beddesho cuntadaada ama daawooyinkaaga.      Saddex maalmood ka hor imtixaanka:  Ku bilow cunto claudine oo faybarku ku claudine yahay: Boo qaadan miraha ama khudaarta ceeriin ka , sarreen velia, iniinaha, lawska, salool ama cuntooyinka kale ee faybarku ku badan yahay. Boo isticmaalin waxyaaba u uku jiro xidid: (bran, Metamucil, Fibercon), ha qaadan cunta dufan leh.    Pako maalin ka hor baaritaanka:   Waxaad cuni kartaa quraac fudud, oo fiber-claudine. Laakin cab cabitaanno [facility] sagaalka  subaxnimo kadib  Ha cunin cunto adag hana cabin waxyaabaha [facility]. Boo cabbin wax cabitaan  oo casaan ama guduud ah. Cab cabitaanno [facility] oo keliya (eeg liiska hoose)  Cab ugu yaraan 8 ilaa 10 koob oo cabitaano [facility] oo buuxa maalintii.  Ku cyrus ramirez ay  ku jirto budada Golytely biyo diirran. Dabool oo rux ilaa inta si fiican ay u  qasanto. Isticmaal gallon biyo ah oo buuxa. Qabooji ilaa iyo 3 saacadood, laakiin ha ku solo baraf.  Waxaad bilaabi doontaa inaad cabto jessica bad xalka Golytely 6-da galabnimo Wakhtiga la cabbo qaybta 2aad ee xalka Golytely waxay ku xidhan tahay wakhtiga imtixaankaaga.  Kadib markaad bilawdo cabitaanka joog meel u dhow musqusha. Waxa laga yaabaa in aad qabto saxaro biya ah  (shuban), casiraad ama calool maroojis fudud, alooshoo ku fuurta iyo lallabo.    Talaabada ugu horeysa   Marka ay tahay 3 p.m. - qaado laba kaniini oo ah Dulcolax (bisacodyl)   Marka ay tahay 6 p.m.- Bilow cabitaanka dareeraha golytely Cab luis koob, oo le'eg 8 wiqiyadood, 15kii daqiiqaba ilaa [facility].      Tallaabada labaad    Haddii aad timaado ka hor 11AM  Marka ay tahay 11PM maalinta ka Horreysa Baaritaankaaga:  Qaado laba kaniini oo ah Dulcolax (bisacodyl)  Bilow cabbista dareeraha golytely Cab luis galaas, oo u dhiganta 8 wiqiyadood, 15kii daqiiqaba ilaa [facility]. Haddaad timaad ka carlos 11AM. Maalinta Baaritaanka  Marka ay tahay 6AM   Qaado laba kaniini oo ah Dulcolax (bisacodyl)  Bilow cabbista dareeraha golytely Cab luis galaas, oo u dhiganta 8 wiqiyadood, 15kii daqiiqaba ilaa weelku madhan yahay.     Maalinta Baaritaanka   Waxba boo cabbin 2 saacadood ka hor imaatinkaada, xataa biyo ha cabbin. Haddii aad sameyso, waxaa laga yaabaa inaan joojino imtixaanka  Boo ka qaadan dawada sonkorowga afka ilaa ka carlos baaritaankaaga.  Daawooyinkii lagu siiyey waqtigii baaritaanku waxay kaa yeeli doonaan [facility]. Waxaad u baahan doontaa qof weyn oo qoyska ka tirsan ama saaxiib in uu guriga kuu kaxeeyo iyadoo ammaankaagu sidaas ku jiro. George kuuma aha inaad kaligaa baxdid ama aad baabuur wadid ka carlos baadhitaanka  Baabuur boo wadin, ha gaadhin go'aamo waaweyn ama ha saxeexin waraaqo ah sharci mudo ah 24 saacadood ka carlos baadhitaanka  Waxaad  si caadi ah ugu noqon kartaa cuntadaadii caadiga ahayd baaritaanka ka carlos. Haddii polyp meesha laga colby saaro, takhtarkaagu waxaa laga yaabaa in uu xaddido cuntadaada waqti gaaban  Waxaa laga yaabaa in xoogaa delfina oo dhiig ah uu malawadkaaga ka yimaado. Taasi waa wax iska caadi ah  Natiijooyinka waxaa loo dirayaa takhtarkaaga. Takhtarkaagu adiga ayuu natiijooyinka juliet wadaagi doonaa      DAREERE CAD   Cab Kaliya:  Biyo   Maraq cad ama fuud   Casiirrada furutada cad ee aan lahayn saxarka sida tufaaxa, canabka, iyo liinta   Cabitaannada cad sida soodhada liinta, Kade-aid ama cabitaannada isboortiga.   Qaxwo ama shaah aan caano lahayn ama labeen aan caano ka sameysneyn   Jello ama popsicles   Ha Cabbin:  carrington alcantar cabitaan casaan ama guduud   Caano ama labeen  Casiirka leh saxarka sida oranji, cananaaska, cananaaska ama casiirka yaanyada  Khamriga  Jalaato       CUNTO FIBER DELFINA    Cab Kaliya:    Istaarijyada: Rooti cad, duubo, buskut, karootada, rootiga Sita, tortillas bur cad, waffle, canjeero, rooti Faransiis; bariis cad, baasto, baasto, macaroni; baradho la kariyey oo diiray; buskudka caadiga , cusbada; Fariin la kariyey ama labeen bariis ; bariis bararsan, maraqa galleyda, Rice Krispies, Special K  Khudaarta: jilicsan oo la kariyey iyo qasacadaysan, maraq khudradeed  Miraha iyo casiirka khudradda: Casiirka khudradda la miiray, khudrad qasacadaysan oo aan iniin iyo maqaaelpidio granados ([facility] cananaaska), suugo tufaax ah, maraqa pear, muus bislaaday, qaraha ([facility] qaraha)  Alaabhyadee caanaha: Caanaha (caadi a [facility]), farmaajo, jiis, caano fadhi (adele negron), tonya, jalaatada  Barootiinnada: Hilibka lo'da ee la shiiday, si fiican loo kariyey, wan, hilib lo'aad, ham, hilib doofaar, digaag, tiffany, kalluunka ama hilibka xubnaha; ukunta; subagga lawska sneha [place] iyo xashiishka: Margarine, subagga, saliidaha, majones, labeen dhanaan, salad salad, maraqa cad; brock udgoon, gee la kariyey; sridevikorta,  ritika cad, malab, sharoobada  Cunto fudud, nacnac iyo cabitaan: Pretzels, nacnac adag; keega cad iyo buskudka (aan lahayn nuts ama miraha); jelatin, pudding cad, sherbet, Popsicles; kafeega, shaaha, cabitaannada kaarboonaysan    Ybarra cunin:    Istaarij: Rooti ama duubo ay ku jiraan lawska, iniinaha ama miraha; rootiga sarreenka ah ama rootiga badarka oo velia oo ay ku jiraan in ka badan 1 garaam oo fiber ah xabbadiiba; rooti galley; galley ama tortillas oo sarreen ah; baradhada maqaarka; bariis bunni, bariis duurjoog ah, kasha (buckwheat), iyo boora  Khudaarta: Khudaar kasta oo cayriin ama la caadka ah; khudaarta leh miraha; michelleey nooc kasta ybarra ahaatee  Miraha iyo casiirka furuutka: Burburka, casiirka bursan, sabiibka iyo miraha kale ee la qalajiyey, berry iyo miraha kale ee leh iniinaha, casiirka cananaaska qasacadaysan oo leh saxarka sida liinta, bambeelmada, cananaaska ama juice yaanyo  Alaabta caanaha: caano fadhi kasta oo leh lawska, iniinaha ama berry  Borotiinno: Hilib adag oo fibro ah oo leh xabag; digirta qallalan ee la kariyey, digir ama misir; subagga lawska oo ruuggan  Dufanka iyo xawaashka: pickles, saytuun, raaxaysi, malayga; macaanka, marmalade, ilaalinta  Cunto fudud, nacnac iyo cabitaan: salool, laws, iniin, granola, qumbaha, nacnac lagu sameeyay lawska ama iniinaha; gracielaBarnesville Hospitaltiti ontiverosmacaangiuliana ay ku jiraan lawska, iniinaha, sabiibka iyo miraha kale ee la qalajiyey, qumbaha, miraha isku velia ama bran.       Gama'aalaha inta badan la is weydiiyo:    Sidee ku ogaan kartaa in [facility] ay saxarada oodhan bannaanka a colby baxay?  Radha caceresjeanne owensmaystandrew cortesyadamaris bolanos, caitaradarosa [facility] henryy noqotaa [facility] Hospitaltiti senior . Dhaqdhaqaaqa, rosalina caceresa uu sameynaayo colonoscopiada [facility] in [facility] nadhiif . Mary shaffer  oo kandice loaiza mindhicirtaada , vedalan [facility] virifiiskvasu garcia . [facility] [phone] oo weydii inaad la hadasho kalkaaliso.    Daquanaajeanne u jaren penningtonchristie  darawal mas'uul ah inuu guriga ku geeyo oo juliet joogo?  Waxaan u baahanahay qof weyn oo mas'uul ah inuu guriga ku geeyo badbaadadaada aawadeed. Daawooyinka suuxinta ee loo isticmaalo in lagu nasiyo inta lagu guda jiro habsocodka waxay wax u dhimi karaan xukunkaaga iyo wakhtiga falcelinta, waxay kaa dhigi karaan kuwo illowsi ah oo suurtogal ah in claudine oo xasilloon. Boo wadin, ha samayn wax go'aano muhiim ah, hana saxeexin dukumeenti kasta oo sharci ah 24 saacadood ka carlos nidaamkaaga.    Goorma ayaad wax cuni kartaa ka carlos nidaamkaaga?  Waxaad carlos u bilaabi kartaa cuntadaada caadiga ah markaad dareento inaad diyaar u tahay, ilaa uu si kale kuugula taliyo dhakhtarka hawaada fulinaya. Boo cabbin aalkolo 24 saacadood ka carlos nidaamkaaga.    Goorma ayaad mai doontaa natiijooyinka baaritaanka?  Waa inaad ku heshaa natiijooyinka habraacaaga iyo wixii natiijooyin shaybaadhka ah (haddii ay khusayso) warqad ahaan, fariinta MyChart, ama [facility] telefoon 2 toddobaad gudahood. Haddii aad wax chicas'aalo ah qabtid, fadlan [facility] dhatarka kuu colby diray hawsha.    Waa caadi inaad dareento barar iyo gaas qalliinka ka carlos. Socodka ayaa kaa caawin doona in ay hawadu dhex marto xiidankaaga. Waxaad qaadan kartaa xanuun baabi'iyaha aan aspirin ahayn oo ay ku jiraan acetaminophen (Tylenol).    Geoffreyo modesto warner carlos u bilaabaya dhaqdhaqaaqyada mari  (eduardo bird, raad.) 24 yakelin gonzalez.

## 2023-05-05 NOTE — TELEPHONE ENCOUNTER
Wali  ID [id number]    Second attempt for pre-assessment prior to upcoming colonoscopy on 5.12.23     No answer.  Left message to return call [phone] #4    Rita Rivera RN  Endoscopy Procedure Pre Assessment RN

## 2023-05-10 NOTE — TELEPHONE ENCOUNTER
Third attempt for pre-assessment prior to upcoming colonoscopy     No answer. Unable to leave message.  states phone shows that it is connected but there is no one there. Did attempt to contact x 2 with the .    Nadine Subramanian RN  Endoscopy Procedure Pre Assessment RN

## 2023-05-11 ENCOUNTER — TELEPHONE (OUTPATIENT)
Dept: GASTROENTEROLOGY | Facility: CLINIC | Age: 48
End: 2023-05-11

## 2023-05-11 NOTE — TELEPHONE ENCOUNTER
Patient calling in stating he could not  his prescription from pharmacy. Patient advised rx was sent to [facility] per his request when speaking with scheduling staff.     Patient states he had breakfast of porridge and pancakes at 0830 today. Tried discussing clear liquid diet, patient avoiding question. Attempted to ask patient multiple times if we could go through instructions, patient continues to avoid question, wants to know where rx was sent. RN did advise there are many instructions to go through prior to procedure to make sure patient is well cleaned out and prepared for procedure.   Patient is frustrated and states would like to cancel. RN did advise patient we can take our time and talk through instructions. Patient agrees.     When talking through information patient does not have someone to stay with him x 24 hours and does not have time to find someone since his procedure is tomorrow. Message sent to scheduling team to cancel. Patient states he will call back to reschedule.     Nadine Subramanian RN  Endoscopy Procedure Pre Assessment RN

## 2023-05-11 NOTE — TELEPHONE ENCOUNTER
Caller: Maurice Yu    Reason for Reschedule/Cancellation (please be detailed, any staff messages or encounters to note?): Patient needs to cancel, will not have adult supervision post procedure. Patient states he will call back to reschedule.    Nadine Subramanian, RN  P Endoscopy Scheduling Pool  Please cancel this patients procedure for 5/12, he does not have someone to stay with him x 24 hours after sedation       Prior to reschedule please review:    Ordering Provider:Shahana Lira    Sedation per order:MAC    Does patient have any ASC Exclusions, please identify?: NO      Notes on Cancelled Procedure:    Procedure:Lower Endoscopy [Colonoscopy]     Date: 5/12    [facility]; [address] [place] [address], 5th Floor, [place] [address]    Surgeon: BI ARAYA        Rescheduled: No

## 2023-06-29 ENCOUNTER — OFFICE VISIT (OUTPATIENT)
Dept: INTERNAL MEDICINE | Facility: CLINIC | Age: 48
End: 2023-06-29
Payer: COMMERCIAL

## 2023-06-29 VITALS
OXYGEN SATURATION: 99 % | HEART RATE: 67 BPM | SYSTOLIC BLOOD PRESSURE: 119 MMHG | BODY MASS INDEX: 23.92 KG/M2 | RESPIRATION RATE: 16 BRPM | HEIGHT: 73 IN | WEIGHT: 180.5 LBS | DIASTOLIC BLOOD PRESSURE: 81 MMHG | TEMPERATURE: 98.2 F

## 2023-06-29 DIAGNOSIS — R20.8 BURNING SENSATION: ICD-10-CM

## 2023-06-29 DIAGNOSIS — Z13.220 ENCOUNTER FOR SCREENING FOR LIPID DISORDER: ICD-10-CM

## 2023-06-29 DIAGNOSIS — R52 PAINFUL PARESTHESIA: ICD-10-CM

## 2023-06-29 DIAGNOSIS — R20.2 PAINFUL PARESTHESIA: ICD-10-CM

## 2023-06-29 DIAGNOSIS — Z12.11 SCREENING FOR COLON CANCER: ICD-10-CM

## 2023-06-29 DIAGNOSIS — R53.83 FATIGUE, UNSPECIFIED TYPE: Primary | ICD-10-CM

## 2023-06-29 LAB
ALBUMIN SERPL BCG-MCNC: 4.6 G/DL (ref 3.5–5.2)
ALBUMIN UR-MCNC: NEGATIVE MG/DL
ALP SERPL-CCNC: 56 U/L (ref 40–129)
ALT SERPL W P-5'-P-CCNC: 20 U/L (ref 0–70)
ANION GAP SERPL CALCULATED.3IONS-SCNC: 14 MMOL/L (ref 7–15)
APPEARANCE UR: CLEAR
AST SERPL W P-5'-P-CCNC: 25 U/L (ref 0–45)
BASOPHILS # BLD AUTO: 0 10E3/UL (ref 0–0.2)
BASOPHILS NFR BLD AUTO: 0 %
BILIRUB SERPL-MCNC: 0.6 MG/DL
BILIRUB UR QL STRIP: NEGATIVE
BUN SERPL-MCNC: 17.4 MG/DL (ref 6–20)
CALCIUM SERPL-MCNC: 9.4 MG/DL (ref 8.6–10)
CHLORIDE SERPL-SCNC: 102 MMOL/L (ref 98–107)
CHOLEST SERPL-MCNC: 204 MG/DL
COLOR UR AUTO: YELLOW
CREAT SERPL-MCNC: 1.01 MG/DL (ref 0.67–1.17)
DEPRECATED HCO3 PLAS-SCNC: 23 MMOL/L (ref 22–29)
EOSINOPHIL # BLD AUTO: 0.1 10E3/UL (ref 0–0.7)
EOSINOPHIL NFR BLD AUTO: 2 %
ERYTHROCYTE [DISTWIDTH] IN BLOOD BY AUTOMATED COUNT: 11.5 % (ref 10–15)
GFR SERPL CREATININE-BSD FRML MDRD: >90 ML/MIN/1.73M2
GLUCOSE SERPL-MCNC: 98 MG/DL (ref 70–99)
GLUCOSE UR STRIP-MCNC: NEGATIVE MG/DL
HBA1C MFR BLD: 5.6 % (ref 0–5.6)
HCT VFR BLD AUTO: 40.3 % (ref 40–53)
HDLC SERPL-MCNC: 43 MG/DL
HGB BLD-MCNC: 13.7 G/DL (ref 13.3–17.7)
HGB UR QL STRIP: NEGATIVE
IMM GRANULOCYTES # BLD: 0 10E3/UL
IMM GRANULOCYTES NFR BLD: 0 %
KETONES UR STRIP-MCNC: NEGATIVE MG/DL
LDLC SERPL CALC-MCNC: 136 MG/DL
LEUKOCYTE ESTERASE UR QL STRIP: NEGATIVE
LYMPHOCYTES # BLD AUTO: 1.7 10E3/UL (ref 0.8–5.3)
LYMPHOCYTES NFR BLD AUTO: 31 %
MCH RBC QN AUTO: 29.8 PG (ref 26.5–33)
MCHC RBC AUTO-ENTMCNC: 34 G/DL (ref 31.5–36.5)
MCV RBC AUTO: 88 FL (ref 78–100)
MONOCYTES # BLD AUTO: 0.5 10E3/UL (ref 0–1.3)
MONOCYTES NFR BLD AUTO: 10 %
NEUTROPHILS # BLD AUTO: 3.1 10E3/UL (ref 1.6–8.3)
NEUTROPHILS NFR BLD AUTO: 58 %
NITRATE UR QL: NEGATIVE
NONHDLC SERPL-MCNC: 161 MG/DL
PH UR STRIP: 6 [PH] (ref 5–7)
PLATELET # BLD AUTO: 181 10E3/UL (ref 150–450)
POTASSIUM SERPL-SCNC: 4 MMOL/L (ref 3.4–5.3)
PROT SERPL-MCNC: 7.4 G/DL (ref 6.4–8.3)
RBC # BLD AUTO: 4.59 10E6/UL (ref 4.4–5.9)
SODIUM SERPL-SCNC: 139 MMOL/L (ref 136–145)
SP GR UR STRIP: >=1.03 (ref 1–1.03)
TOTAL PROTEIN SERUM FOR ELP: 7 G/DL (ref 6.4–8.3)
TRIGL SERPL-MCNC: 123 MG/DL
TSH SERPL DL<=0.005 MIU/L-ACNC: 0.74 UIU/ML (ref 0.3–4.2)
UROBILINOGEN UR STRIP-ACNC: 0.2 E.U./DL
VIT B12 SERPL-MCNC: 287 PG/ML (ref 232–1245)
WBC # BLD AUTO: 5.4 10E3/UL (ref 4–11)

## 2023-06-29 PROCEDURE — 82607 VITAMIN B-12: CPT | Performed by: INTERNAL MEDICINE

## 2023-06-29 PROCEDURE — 80050 GENERAL HEALTH PANEL: CPT | Performed by: INTERNAL MEDICINE

## 2023-06-29 PROCEDURE — 84165 PROTEIN E-PHORESIS SERUM: CPT

## 2023-06-29 PROCEDURE — 83036 HEMOGLOBIN GLYCOSYLATED A1C: CPT | Performed by: INTERNAL MEDICINE

## 2023-06-29 PROCEDURE — 36415 COLL VENOUS BLD VENIPUNCTURE: CPT | Performed by: INTERNAL MEDICINE

## 2023-06-29 PROCEDURE — 81003 URINALYSIS AUTO W/O SCOPE: CPT | Performed by: INTERNAL MEDICINE

## 2023-06-29 PROCEDURE — 80061 LIPID PANEL: CPT | Performed by: INTERNAL MEDICINE

## 2023-06-29 PROCEDURE — 99214 OFFICE O/P EST MOD 30 MIN: CPT | Performed by: INTERNAL MEDICINE

## 2023-06-29 RX ORDER — METFORMIN HCL 500 MG
TABLET, EXTENDED RELEASE 24 HR ORAL
COMMUNITY
Start: 2023-05-05 | End: 2023-06-29

## 2023-06-29 RX ORDER — ROSUVASTATIN CALCIUM 20 MG/1
TABLET, COATED ORAL
COMMUNITY
Start: 2023-05-08 | End: 2023-06-29

## 2023-06-29 RX ORDER — BLOOD-GLUCOSE SENSOR
EACH MISCELLANEOUS
COMMUNITY
Start: 2023-06-17 | End: 2023-06-29

## 2023-06-29 RX ORDER — ACETAMINOPHEN 325 MG/1
325-650 TABLET ORAL EVERY 6 HOURS PRN
Qty: 100 TABLET | Refills: 3 | Status: SHIPPED | OUTPATIENT
Start: 2023-06-29 | End: 2024-10-03

## 2023-06-29 RX ORDER — DAPAGLIFLOZIN 10 MG/1
TABLET, FILM COATED ORAL
COMMUNITY
Start: 2023-06-05 | End: 2023-06-29

## 2023-06-29 ASSESSMENT — ENCOUNTER SYMPTOMS: FATIGUE: 1

## 2023-06-29 ASSESSMENT — PATIENT HEALTH QUESTIONNAIRE - PHQ9
SUM OF ALL RESPONSES TO PHQ QUESTIONS 1-9: 6
SUM OF ALL RESPONSES TO PHQ QUESTIONS 1-9: 6
10. IF YOU CHECKED OFF ANY PROBLEMS, HOW DIFFICULT HAVE THESE PROBLEMS MADE IT FOR YOU TO DO YOUR WORK, TAKE CARE OF THINGS AT HOME, OR GET ALONG WITH OTHER PEOPLE: NOT DIFFICULT AT ALL

## 2023-06-29 ASSESSMENT — PAIN SCALES - GENERAL: PAINLEVEL: NO PAIN (0)

## 2023-06-29 NOTE — PROGRESS NOTES
"Assessment & Plan   Fatigue, unspecified type  Unclear etiology. No localizing symptom other than skin as below (unclear if connected). He will obtain lab work-up as ordered by derm. I will also add on this below work-up.  - CBC with Platelets & Differential; Future  - Comprehensive metabolic panel; Future  - TSH with free T4 reflex; Future  - UA Macroscopic with reflex to Microscopic and Culture; Future    Burning sensation  Unclear etiology. Pregabalin didn't help. Will trial capsaicin cream. If no relief with that, then trial amitriptyline. Recommended he take this at night. May help him sleep better as well. Refilled Tylenol per request.  - capsaicin-menthol-methyl sal 0.025-1-12 % external cream; Apply topically 3 times daily as needed (for burning/irritation)  - amitriptyline (ELAVIL) 25 MG tablet; Take 1 tablet (25 mg) by mouth At Bedtime  - acetaminophen (TYLENOL) 325 MG tablet; Take 1-2 tablets (325-650 mg) by mouth every 6 hours as needed for mild pain    Encounter for screening for lipid disorder  - Lipid panel reflex to direct LDL Non-fasting; Future    Screening for colon cancer  Due for screening. He was open to FIT.  - Fecal colorectal cancer screen FIT; Future    Nitish Galvin MD  [facility]    Rosemarie Hankins is a 48 year old who presents for a next day acute care visit with chief concern of: fatigue. Ongoing for weeks. Still having burning sensation in skin. Saw derm in Jan. Pregabalin didn't help. He sleeps 5-6 hours a night. Denies f/c, n/v, CP, SOB, cough, h/a, urinary or bowel changes. No blood in stool.    Review of Systems   Constitutional: Positive for fatigue.         Objective    /81 (BP Location: Left arm, Patient Position: Sitting, Cuff Size: Adult Large)   Pulse 67   Temp 98.2  F (36.8  C) (Oral)   Resp 16   Ht 1.854 m (6' 1\")   Wt 81.9 kg (180 lb 8 oz)   SpO2 99%   BMI 23.81 kg/m    Body mass index is 23.81 kg/m .     Physical Exam "   GENERAL: alert and in no distress.  EYES: conjunctivae/corneas clear. EOMs grossly intact  HENT: Facies symmetric.  RESP: No iWOB.  MSK: Moves all four extremities freely  SKIN: No significant ulcers, lesions, or rashes on the visualized portions of the skin  NEURO: CN II-XII grossly intact.

## 2023-06-29 NOTE — PATIENT INSTRUCTIONS
- I will send you a message on [employer] when I am able to look at the results of your tests from today  - If cream does not help, then I would try a pill called amitriptyline

## 2023-06-30 LAB
ALBUMIN SERPL ELPH-MCNC: 4.5 G/DL (ref 3.7–5.1)
ALPHA1 GLOB SERPL ELPH-MCNC: 0.2 G/DL (ref 0.2–0.4)
ALPHA2 GLOB SERPL ELPH-MCNC: 0.6 G/DL (ref 0.5–0.9)
B-GLOBULIN SERPL ELPH-MCNC: 0.6 G/DL (ref 0.6–1)
GAMMA GLOB SERPL ELPH-MCNC: 1.1 G/DL (ref 0.7–1.6)
M PROTEIN SERPL ELPH-MCNC: 0 G/DL
PROT PATTERN SERPL ELPH-IMP: NORMAL

## 2023-10-21 ENCOUNTER — HEALTH MAINTENANCE LETTER (OUTPATIENT)
Age: 48
End: 2023-10-21

## 2024-03-12 ENCOUNTER — TRANSCRIBE ORDERS (OUTPATIENT)
Dept: OTHER | Age: 49
End: 2024-03-12

## 2024-03-12 ENCOUNTER — MEDICAL CORRESPONDENCE (OUTPATIENT)
Dept: HEALTH INFORMATION MANAGEMENT | Facility: CLINIC | Age: 49
End: 2024-03-12
Payer: COMMERCIAL

## 2024-03-12 DIAGNOSIS — M79.642 PAIN OF LEFT HAND: Primary | ICD-10-CM

## 2024-03-16 NOTE — TELEPHONE ENCOUNTER
DIAGNOSIS: Hand and finger pain   APPOINTMENT DATE: 03/21/24   NOTES STATUS DETAILS   OFFICE NOTE from referring provider SELF    OFFICE NOTE from other specialist Care Everywhere 03/12/24: Dr. Shahana Lira   MEDICATION LIST Care Everywhere    LABS     CBC/DIFF Care Everywhere Most recent 03/12/24

## 2024-03-20 ENCOUNTER — TELEPHONE (OUTPATIENT)
Dept: ORTHOPEDICS | Facility: CLINIC | Age: 49
End: 2024-03-20
Payer: COMMERCIAL

## 2024-03-20 DIAGNOSIS — M79.642 LEFT HAND PAIN: Primary | ICD-10-CM
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
## 2024-03-21 ENCOUNTER — ANCILLARY PROCEDURE (OUTPATIENT)
Dept: GENERAL RADIOLOGY | Facility: CLINIC | Age: 49
End: 2024-03-21
Attending: FAMILY MEDICINE
Payer: MEDICAID

## 2024-03-21 ENCOUNTER — PRE VISIT (OUTPATIENT)
Dept: ORTHOPEDICS | Facility: CLINIC | Age: 49
End: 2024-03-21

## 2024-03-21 ENCOUNTER — OFFICE VISIT (OUTPATIENT)
Dept: ORTHOPEDICS | Facility: CLINIC | Age: 49
End: 2024-03-21
Attending: FAMILY MEDICINE
Payer: MEDICAID

## 2024-03-21 DIAGNOSIS — L98.9 SKIN LESION OF HAND: Primary | ICD-10-CM

## 2024-03-21 PROCEDURE — 99203 OFFICE O/P NEW LOW 30 MIN: CPT | Performed by: STUDENT IN AN ORGANIZED HEALTH CARE EDUCATION/TRAINING PROGRAM

## 2024-03-21 PROCEDURE — 73130 X-RAY EXAM OF HAND: CPT | Mod: LT | Performed by: RADIOLOGY
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
## 2024-04-02 ENCOUNTER — TRANSCRIBE ORDERS (OUTPATIENT)
Dept: OTHER | Age: 49
End: 2024-04-02

## 2024-04-02 DIAGNOSIS — R22.32 NODULE OF FINGER OF LEFT HAND: Primary | ICD-10-CM

## 2024-06-14 ENCOUNTER — TRANSCRIBE ORDERS (OUTPATIENT)
Dept: OTHER | Age: 49
End: 2024-06-14

## 2024-06-14 DIAGNOSIS — E11.9 CONTROLLED TYPE 2 DIABETES MELLITUS WITHOUT COMPLICATION, WITH LONG-TERM CURRENT USE OF INSULIN (H): ICD-10-CM

## 2024-06-14 DIAGNOSIS — H54.7 DECREASED VISION: Primary | ICD-10-CM

## 2024-06-14 DIAGNOSIS — Z79.4 CONTROLLED TYPE 2 DIABETES MELLITUS WITHOUT COMPLICATION, WITH LONG-TERM CURRENT USE OF INSULIN (H): ICD-10-CM

## 2024-06-25 ENCOUNTER — OFFICE VISIT (OUTPATIENT)
Dept: OPHTHALMOLOGY | Facility: CLINIC | Age: 49
End: 2024-06-25
Attending: FAMILY MEDICINE
Payer: COMMERCIAL

## 2024-06-25 DIAGNOSIS — Z79.4 CONTROLLED TYPE 2 DIABETES MELLITUS WITHOUT COMPLICATION, WITH LONG-TERM CURRENT USE OF INSULIN (H): Primary | ICD-10-CM

## 2024-06-25 DIAGNOSIS — H25.049 POSTERIOR SUBCAPSULAR AGE-RELATED CATARACT: ICD-10-CM

## 2024-06-25 DIAGNOSIS — H04.129 DRY EYE: ICD-10-CM

## 2024-06-25 DIAGNOSIS — H54.7 DECREASED VISION: ICD-10-CM

## 2024-06-25 DIAGNOSIS — E11.9 CONTROLLED TYPE 2 DIABETES MELLITUS WITHOUT COMPLICATION, WITH LONG-TERM CURRENT USE OF INSULIN (H): Primary | ICD-10-CM

## 2024-06-25 PROCEDURE — 92004 COMPRE OPH EXAM NEW PT 1/>: CPT | Performed by: OPTOMETRIST

## 2024-06-25 RX ORDER — CARBOXYMETHYLCELLULOSE SODIUM 5 MG/ML
1 SOLUTION/ DROPS OPHTHALMIC 3 TIMES DAILY PRN
Qty: 70 EACH | Refills: 11 | Status: SHIPPED | OUTPATIENT
Start: 2024-06-25

## 2024-06-25 ASSESSMENT — CONF VISUAL FIELD
OD_SUPERIOR_NASAL_RESTRICTION: 0
OD_INFERIOR_TEMPORAL_RESTRICTION: 0
OS_SUPERIOR_TEMPORAL_RESTRICTION: 0
OS_NORMAL: 1
OS_INFERIOR_NASAL_RESTRICTION: 0
OS_SUPERIOR_NASAL_RESTRICTION: 0
OS_INFERIOR_TEMPORAL_RESTRICTION: 0
OD_INFERIOR_NASAL_RESTRICTION: 0
METHOD: COUNTING FINGERS
OD_SUPERIOR_TEMPORAL_RESTRICTION: 0
OD_NORMAL: 1

## 2024-06-25 ASSESSMENT — SLIT LAMP EXAM - LIDS
COMMENTS: 2+ MGD
COMMENTS: 2+ MGD

## 2024-06-25 ASSESSMENT — EXTERNAL EXAM - LEFT EYE: OS_EXAM: NORMAL

## 2024-06-25 ASSESSMENT — REFRACTION_MANIFEST
OS_AXIS: 170
OS_CYLINDER: +0.25
OD_SPHERE: +0.50
OD_ADD: +1.50
OS_ADD: +1.50
OS_SPHERE: PLANO
OD_CYLINDER: +0.50
OD_AXIS: 180

## 2024-06-25 ASSESSMENT — TONOMETRY
OD_IOP_MMHG: 10
IOP_METHOD: ICARE
OS_IOP_MMHG: 10

## 2024-06-25 ASSESSMENT — VISUAL ACUITY
OS_SC: 20/50
METHOD: SNELLEN - LINEAR
METHOD_MR_RETINOSCOPY: 1
OD_SC+: -2
OS_SC: 20/70
OD_SC: 20/70
OS_SC+: -2
OD_SC: 20/50

## 2024-06-25 ASSESSMENT — CUP TO DISC RATIO
OS_RATIO: 0.5
OD_RATIO: 0.5

## 2024-06-25 ASSESSMENT — EXTERNAL EXAM - RIGHT EYE: OD_EXAM: NORMAL

## 2024-06-25 NOTE — PROGRESS NOTES
A/P  1.) Type 2 DM without ophthalmic manifestation OU  -Last A1c 7.2, unknown h/o diabetic retinopathy  -Reviewed effects of DM on the eyes and importance of good blood sugar control  -Monitor annually with dilation    2.) Cataract left eye>right eye  -PSC cataract limiting vision. BCVA 20/25 right, 20/50 left  -Reviewed findings with pt including option for cataract surgery referral left eye  -He is functioning well with current level of vision and would like to monitor    3.) Dry Eye OU  -Start AT 3-4x/day OU    Monitor 1 year diabetic eye exam, sooner prn    I have confirmed the patient's CC, HPI and reviewed Past Medical History, Past Surgical History, Social History, Family History, Problem List, Medication List and agree with Tech note.     Hannah Landis, SULAIMAN CERDAO JITENDRAS

## 2024-10-03 ENCOUNTER — OFFICE VISIT (OUTPATIENT)
Dept: URGENT CARE | Facility: URGENT CARE | Age: 49
End: 2024-10-03
Payer: COMMERCIAL

## 2024-10-03 VITALS
SYSTOLIC BLOOD PRESSURE: 138 MMHG | BODY MASS INDEX: 23.33 KG/M2 | OXYGEN SATURATION: 100 % | DIASTOLIC BLOOD PRESSURE: 98 MMHG | RESPIRATION RATE: 18 BRPM | WEIGHT: 176.8 LBS | HEART RATE: 54 BPM | TEMPERATURE: 97.7 F

## 2024-10-03 DIAGNOSIS — R03.0 ELEVATED BLOOD PRESSURE READING WITHOUT DIAGNOSIS OF HYPERTENSION: Primary | ICD-10-CM

## 2024-10-03 DIAGNOSIS — R51.9 ACUTE NONINTRACTABLE HEADACHE, UNSPECIFIED HEADACHE TYPE: ICD-10-CM

## 2024-10-03 PROCEDURE — 99203 OFFICE O/P NEW LOW 30 MIN: CPT | Performed by: FAMILY MEDICINE

## 2024-10-03 RX ORDER — ACETAMINOPHEN 325 MG/1
325-650 TABLET ORAL EVERY 6 HOURS PRN
COMMUNITY
Start: 2024-10-03 | End: 2024-10-08

## 2024-10-03 NOTE — PROGRESS NOTES
ASSESSMENT/PLAN:      ICD-10-CM    1. Elevated blood pressure reading without diagnosis of hypertension  R03.0     Increased blood pressures 150s/ 90s, headaches in the last 4 days.no headache today, bp 150s/90s, repeat 130-140/80-90, ? new onset HTN      2. Acute nonintractable headache, unspecified headache type  R51.9     onset of 2 headaches with elevated bp in last 4 days,headaches resolved within 24 hours,no headache today,no hx of chronic headaches ? likely due to elevated bp        Patient with no past history of migraines or chronic headaches -4 days ago onset headache brief episode of blurry vision beginning of headache resolved in less than an hour  lasted all day and night, went to [facility] checked blood pressure 150/90s no history of hypertension   Pt headache free until 2 days ago with  onset of headache, no changes in vision, resolve before bedtime - BP at [facility] was 150s/90s no headache today-initial blood pressure in clinic 150/90, repeat 130s-140s/80s-/90    Due to multiple elevated blood pressures, headaches, ? new onset hypertension    Pt will schedule follow-up appointment primary care provider tomorrow-Friday 10/4 or with one of his PCPs associates-appointment is available we will try to be seen on Monday, 107 for further evaluation of his elevated blood pressures and needs to start medication for hypertension        Patient Instructions       Please make an appointment with your primary care provider or any of their associates to be seen tomorrow for further evaluation of elevated blood pressure and history of headaches this week.      If headache becomes more severe, changes to vision, any weakness, any dizziness lightheadedness to ER      Reviewed medication instructions and side effects. Follow up if experiences side effects.     I reviewed supportive care, otc meds to use if needed, expected course, and signs of concern.  Follow up as needed or if he does not improve within  1-2 days  or if worsens in any way.  Reviewed red flag symptoms and is to go to the ER if experiences any of these.     The use of Dragon/PowerMic dictation services may have been used to construct the content in this note; any grammatical or spelling errors are non-intentional. Please contact the author of this note directly if you are in need of any clarification.      On the day of the encounter, time spend on chart review, patient visit, review of testing, documentation was 30 minutes              Patient presents with:  Headache: For 2 days  with recent elevated BP readings yesterday at [facility]     Cr Liu is a 49 year old male who presents to clinic today for the following health issues:    HPI       Headaches    Duration: 4 days ago headache,10 am onset of headache, mild blurring of vision which resolved after a few hours, took 1000 mg tylenol for his headache with minimal relief of headache -he went to [facility] and his blood pressure was 158/ 90s          went to bed with headache, by next morning headache resolved   no headaches until onset of headache yesterday afternoon-took his blood pressure at [employer] in the 158/90's, headache went away without Tylenol yesterday afternoon/evening, no headache when he went to bed last night   No Headache today    Headaches in past have 1-2 times a year and typically resolve after a few hours   No history of head injury or concussions    Description of headaches  Location: global    Character: dull pain  Frequency:  as noted   Duration: As noted    Accompanying signs and symptoms:  Precipitating or Alleviating factors:  Nausea/vomiting: no  Dizziness: no  Weakness or numbness: no  Visual changes: blurry vision Monday resolved after tylenol-wears glasses for reading   Fever: no   Sinus or URI symptoms no   History  Head trauma: no   Family history of migraines: no   Previous tests for headaches: no   Neurologist evaluations: no   Able to do daily  activities when headache present: yes  Wake with headaches: no except as noted   Daily pain medication use: no   Any changes in stressors -family and work  Precipitating or Alleviating factors (light/sound/sleep/caffeine): Caffeine drinks tea-dimas tea X 2 daily -no increase or decrease in caffeine, able to sleep through the night even with headache, no sensitivity to light o, no photophobia  Therapies tried and outcome: Tylenol    Outcome -mildly effective-with initial headache 4 days ago  Frequent/daily pain medication use: None      FH neg for HTN  FH neg for CAD\stroke.         Past Medical History:   Diagnosis Date    Closed displaced fracture of head of left radius with malunion 10/22/2021    s/p fall     Social History     Tobacco Use    Smoking status: Never    Smokeless tobacco: Never   Substance Use Topics    Alcohol use: Not Currently       Current Outpatient Medications   Medication Sig Dispense Refill    acetaminophen (TYLENOL) 325 MG tablet Take 1-2 tablets (325-650 mg) by mouth every 6 hours as needed for mild pain.       No Known Allergies          ROS are negative, except as otherwise noted HPI      Objective    BP (!) 138/98 (BP Location: Left arm)   Pulse 54   Temp 97.7  F (36.5  C)   Resp 18   Wt 80.2 kg (176 lb 12.8 oz)   SpO2 100%   BMI 23.33 kg/m    Body mass index is 23.33 kg/m .    Vitals:    10/03/24 1843 10/03/24 1848 10/03/24 1917 10/03/24 1918   BP: (!) 150/93 (!) 143/93 (!) 146/82 (!) 138/98   BP Location: Left arm Right arm Right arm Left arm   Pulse: 54      Resp: 18      Temp: 97.7  F (36.5  C)      SpO2: 100%      Weight: 80.2 kg (176 lb 12.8 oz)         Physical Exam   GENERAL: alert and no distress  EYES: Eyes grossly normal to inspection, PERRL , EOMI no nystagmus and conjunctivae and sclerae normal  HENT: ear canals and TM's normal, nose and mouth without ulcers or lesions  NECK: no adenopathy, no asymmetry,   RESP: lungs clear to auscultation - no rales, rhonchi or  wheezes  CV: regular rate and rhythm, no murmur, click or rub,   MS: no gross musculoskeletal defects noted, no pretibial edema bilateral  NEURO: Normal strength and tone, mentation intact and speech normal  NEURO: sensory exam grossly normal, cranial nerves 2-12 intact, DTR's normal and symmetric +2  at the knee lateral, gait normal including heel/toe/tandem walking, Romberg normal, and rapid alternating movements normal      Diagnostic Test Results:  Labs reviewed in Epic  No results found for any visits on 10/03/24.

## 2024-10-04 ENCOUNTER — TELEPHONE (OUTPATIENT)
Dept: INTERNAL MEDICINE | Facility: CLINIC | Age: 49
End: 2024-10-04
Payer: COMMERCIAL

## 2024-10-04 NOTE — PATIENT INSTRUCTIONS
Please make an appointment with your primary care provider or any of their associates to be seen tomorrow for further evaluation of elevated blood pressure and history of headaches this week.      If headache becomes more severe, changes to vision, any weakness, any dizziness lightheadedness to ER

## 2024-10-04 NOTE — TELEPHONE ENCOUNTER
Patient was in the ED for Elevated blood pressure, I don't see any openings at  for today. Patient also wanted a early morning appointment it was scheduled on Tuesday.     I wanted to reach out to the nurses to see if this okay. If not please reach out to the patient

## 2024-10-04 NOTE — TELEPHONE ENCOUNTER
Confirmed with PCP patient may keep appt as scheduled.     Future Appointments 10/4/2024 - 4/2/2025        Date Visit Type Length Department Provider     10/8/2024  9:30 AM ED/HOSP FOLLOW UP 30 min  INTERNAL MEDICINE Nitish Bond MD    Location Instructions:     [facility] is in the [facility] at [address] [facility] in [place]. This just east of the [address] [place] [address] exit off of [address]. Free parking is available; access the lot from [address] [place] [address] or [facility].

## 2024-10-08 ENCOUNTER — OFFICE VISIT (OUTPATIENT)
Dept: INTERNAL MEDICINE | Facility: CLINIC | Age: 49
End: 2024-10-08
Payer: COMMERCIAL

## 2024-10-08 VITALS
OXYGEN SATURATION: 98 % | HEART RATE: 58 BPM | DIASTOLIC BLOOD PRESSURE: 88 MMHG | WEIGHT: 175 LBS | BODY MASS INDEX: 23.19 KG/M2 | TEMPERATURE: 98 F | SYSTOLIC BLOOD PRESSURE: 133 MMHG | HEIGHT: 73 IN

## 2024-10-08 DIAGNOSIS — Z12.11 SCREEN FOR COLON CANCER: ICD-10-CM

## 2024-10-08 DIAGNOSIS — R03.0 ELEVATED BLOOD PRESSURE READING WITHOUT DIAGNOSIS OF HYPERTENSION: Primary | ICD-10-CM

## 2024-10-08 PROCEDURE — 99213 OFFICE O/P EST LOW 20 MIN: CPT | Performed by: INTERNAL MEDICINE

## 2024-10-08 PROCEDURE — G2211 COMPLEX E/M VISIT ADD ON: HCPCS | Performed by: INTERNAL MEDICINE

## 2024-10-08 RX ORDER — DAPAGLIFLOZIN 10 MG/1
10 TABLET, FILM COATED ORAL DAILY
COMMUNITY
Start: 2024-09-16 | End: 2024-10-08

## 2024-10-08 RX ORDER — ROSUVASTATIN CALCIUM 10 MG/1
TABLET, COATED ORAL
COMMUNITY
Start: 2024-08-01 | End: 2024-10-08

## 2024-10-08 RX ORDER — MULTIVITAMIN WITH FOLIC ACID 400 MCG
TABLET ORAL
COMMUNITY
Start: 2024-08-03 | End: 2024-10-08

## 2024-10-08 NOTE — PATIENT INSTRUCTIONS
Check your blood pressure at home! You can buy a home monitor in a drugstore, supermarket pharmacy, or other large store. Not all automated blood pressure machines are created equal. You can find a list of validated blood pressure cuffs (meaning they have been confirmed to give accurate readings) by going to www.validatebp.org. That website does not sell blood pressure cuffs, but rather it lists the exact models that have been validated to be accurate. Wrist blood pressure cuffs do not provide reliable comparisons to upper arm (brachial) cuffs. Be sure the arm cuff is the right size for your arm. Ask someone to measure around your upper arm. If your upper arm is more than 13 inches around, buy a monitor with a large cuff. To get a correct measurement, the cuff needs to be the right size.    It is important to measure your blood pressure periodically at home in between office visits. The readings you obtain during these blood pressure checks are often more valuable than the readings we obtain in clinic. However, it is even more important to check your blood pressure CORRECTLY at home. Follow these tips.    Check your blood pressure in the early morning (before you eat, drink, or take any medicines) and at one other random time of day. The random time of day does not need to be consistent from day to day.  Put the cuff on your arm. Remove clothes that get in the way of the cuff. Don t roll up your sleeve in a way that s tight around your arm. The cord should go toward your hand. Line it up with the middle of your forearm. The Velcro should attach easily on the cuff. If it doesn t reach, you may need a bigger cuff.  Wait 30 minutes if you have just eaten a lot, had a drink with caffeine or alcohol, used tobacco products, or exercised. Use the restroom if you need to. (Needing to go can raise your BP.)  Rest both feet flat on the floor with your back supported. Rest your arm at heart level on a table or the arm of a  chair.  Sit quietly for 5 minutes or more before taking your blood pressure. Avoid talking while your blood pressure is being measured.  Start the monitor. Press the button or squeeze the ball to measure your blood pressure. Write down the time, the measurement, and your pulse.  Wait 2 minutes.  Repeat 2 more times.  Take the average of the readings. That's your blood pressure.    Lastly, bring your home monitor into the office for us to validate! Compare your blood pressure monitor to the standardized method we use. It's a good idea to do this once per machine or if your machine starts giving you odd readings (suddenly much higher or lower than you're used to).

## 2024-10-08 NOTE — PROGRESS NOTES
"Assessment & Plan   Elevated blood pressure reading without diagnosis of hypertension  BP elevated in UC recently and at [facility] pharmacy, but not today in clinic. BP cuff rx sent. Encouraged to start checking BP at home and to let me know via MyChart or f/u visit what those readings are in case we need to start medication to obtain optimal BP control.   - Home Blood Pressure Monitor Order for DME - ONLY FOR DME    Screen for colon cancer  UpToDate patient education handout regarding colon cancer screening options printed off and given to patient. He plans to review and let me know at next office visit how he'd like to proceed.    Signed Electronically by:  Nitish Galvin MD, MPH  [facility]  Internal Medicine    The longitudinal plan of care for the diagnosis(es)/condition(s) as documented were addressed during this visit. Due to the added complexity in care, I will continue to support Cr in the subsequent management and with ongoing continuity of care.    Subjective   Cr is a 49 year old who presents for a next day acute care visit with chief concern of: Follow Up (Urgent Care )  My last visit with patient was 1.5 years ago.    HPI   ED/UC Followup:  Facility:  [facility]   Date of visit: 10/3/2024  Reason for visit: Headaches and hypertension   Current Status: headache last night- ibuprofen helped     Checked his BP at [facility] and got 150s/90s.        Objective    /88   Pulse 58   Temp 98  F (36.7  C) (Temporal)   Ht 1.854 m (6' 1\")   Wt 79.4 kg (175 lb)   SpO2 98%   BMI 23.09 kg/m    Body mass index is 23.09 kg/m .    Physical Exam   GENERAL: alert and in no distress.  EYES: conjunctivae/corneas clear. EOMs grossly intact  HENT: Facies symmetric.  RESP: No iWOB.  MSK: Moves all four extremities freely  SKIN: No significant ulcers, lesions, or rashes on the visualized portions of the skin  NEURO: CN II-XII grossly intact.  "

## 2024-12-05 ENCOUNTER — OFFICE VISIT (OUTPATIENT)
Dept: INTERNAL MEDICINE | Facility: CLINIC | Age: 49
End: 2024-12-05
Payer: COMMERCIAL

## 2024-12-05 ENCOUNTER — ORDERS ONLY (AUTO-RELEASED) (OUTPATIENT)
Dept: INTERNAL MEDICINE | Facility: CLINIC | Age: 49
End: 2024-12-05

## 2024-12-05 VITALS
TEMPERATURE: 98.4 F | DIASTOLIC BLOOD PRESSURE: 88 MMHG | WEIGHT: 177.8 LBS | BODY MASS INDEX: 23.46 KG/M2 | OXYGEN SATURATION: 98 % | SYSTOLIC BLOOD PRESSURE: 116 MMHG | HEART RATE: 84 BPM

## 2024-12-05 DIAGNOSIS — E78.5 HYPERLIPIDEMIA LDL GOAL <100: ICD-10-CM

## 2024-12-05 DIAGNOSIS — Z12.11 SCREEN FOR COLON CANCER: ICD-10-CM

## 2024-12-05 DIAGNOSIS — Z00.00 ROUTINE GENERAL MEDICAL EXAMINATION AT A HEALTH CARE FACILITY: Primary | ICD-10-CM

## 2024-12-05 DIAGNOSIS — H53.9 VISION ABNORMALITIES: ICD-10-CM

## 2024-12-05 LAB
ALT SERPL W P-5'-P-CCNC: 17 U/L (ref 0–70)
ANION GAP SERPL CALCULATED.3IONS-SCNC: 7 MMOL/L (ref 7–15)
BUN SERPL-MCNC: 15.8 MG/DL (ref 6–20)
CALCIUM SERPL-MCNC: 9.3 MG/DL (ref 8.8–10.4)
CHLORIDE SERPL-SCNC: 104 MMOL/L (ref 98–107)
CHOLEST SERPL-MCNC: 175 MG/DL
CREAT SERPL-MCNC: 0.97 MG/DL (ref 0.67–1.17)
EGFRCR SERPLBLD CKD-EPI 2021: >90 ML/MIN/1.73M2
FASTING STATUS PATIENT QL REPORTED: YES
FASTING STATUS PATIENT QL REPORTED: YES
GLUCOSE SERPL-MCNC: 103 MG/DL (ref 70–99)
HCO3 SERPL-SCNC: 29 MMOL/L (ref 22–29)
HDLC SERPL-MCNC: 53 MG/DL
LDLC SERPL CALC-MCNC: 107 MG/DL
NONHDLC SERPL-MCNC: 122 MG/DL
POTASSIUM SERPL-SCNC: 4.2 MMOL/L (ref 3.4–5.3)
SODIUM SERPL-SCNC: 140 MMOL/L (ref 135–145)
TRIGL SERPL-MCNC: 74 MG/DL

## 2024-12-05 SDOH — HEALTH STABILITY: PHYSICAL HEALTH: ON AVERAGE, HOW MANY DAYS PER WEEK DO YOU ENGAGE IN MODERATE TO STRENUOUS EXERCISE (LIKE A BRISK WALK)?: 2 DAYS

## 2024-12-05 SDOH — HEALTH STABILITY: PHYSICAL HEALTH: ON AVERAGE, HOW MANY MINUTES DO YOU ENGAGE IN EXERCISE AT THIS LEVEL?: 30 MIN

## 2024-12-05 ASSESSMENT — SOCIAL DETERMINANTS OF HEALTH (SDOH): HOW OFTEN DO YOU GET TOGETHER WITH FRIENDS OR RELATIVES?: ONCE A WEEK

## 2024-12-05 NOTE — PROGRESS NOTES
Preventive Care Visit  [facility]  Nitish Galvin MD, Internal Medicine  Dec 5, 2024    Assessment & Plan   Routine general medical examination at a health care facility  Reviewed PMH. Discussed healthcare maintenance issues, including cancer screenings, relevant immunizations, and cardiac risk factor screenings such as for cholesterol, HTN, and DM. BP normal today in office. Encouraged him to bring his BP cuff with him next visit so we can validate it.    Hyperlipidemia LDL goal <100  Fasting lab check today.  - Basic metabolic panel; Future  - ALT; Future  - Lipid panel reflex to direct LDL Fasting; Future    Screen for colon cancer  Patient agreeable to Cologuard testing. Order placed.  - COLOGUARD([employer]); Future    Vision abnormalities  It's been 3+ years since his last exam, he's interested in another. Referral placed.  - Adult Eye  Referral; Future    Counseling  Appropriate preventive services were addressed with this patient via screening, questionnaire, or discussion as appropriate for fall prevention, nutrition, physical activity, Tobacco-use cessation, social engagement, weight loss and cognition. Checklist reviewing preventive services available has been given to the patient. Reviewed patient's diet, addressing concerns and/or questions. He is at risk for lack of exercise and has been provided with information to increase physical activity for the benefit of his well-being.   The patient was instructed to see the dentist every 6 months. He is at risk for psychosocial distress and has been provided with information to reduce risk.     Signed Electronically by:  Nitish Galvin MD, MPH  [facility]  Internal Medicine    Rosemarie Hankins is a 49 year old presenting for the following: Physical    HPI  Cr presents today for a physical exam. We also discussed his chronic health conditions. He reports his home BP cuff is  reading 130s/80s-90s. He does not have his cuff with him today.    Health Care Directive Patient does not have a Health Care Directive        12/5/2024   General Health   How would you rate your overall physical health? Good   Feel stress (tense, anxious, or unable to sleep) To some extent      (!) STRESS CONCERN      12/5/2024   Nutrition   Three or more servings of calcium each day? Yes   Diet: Regular (no restrictions)   How many servings of fruit and vegetables per day? (!) 2-3   How many sweetened beverages each day? (!) 2          12/5/2024   Exercise   Days per week of moderate/strenous exercise 2 days   Average minutes spent exercising at this level 30 min      (!) EXERCISE CONCERN      12/5/2024   Social Factors   Frequency of gathering with friends or relatives Once a week   Worry food won't last until get money to buy more No   Food not last or not have enough money for food? No   Do you have housing? (Housing is defined as stable permanent housing and does not include staying ouside in a car, in a tent, in an abandoned building, in an overnight shelter, or couch-surfing.) Yes   Are you worried about losing your housing? No   Lack of transportation? No   Unable to get utilities (heat,electricity)? No          12/5/2024   Fall Risk   Fallen 2 or more times in the past year? No    Trouble with walking or balance? No        Patient-reported          12/5/2024   Dental   Dentist two times every year? (!) NO          12/5/2024   TB Screening   Were you born outside of the [place]? Yes     Today's PHQ-2 Score:       10/7/2024    10:56 AM   PHQ-2 ( 1999 [employer])   Q1: Little interest or pleasure in doing things 0    Q2: Feeling down, depressed or hopeless 0    PHQ-2 Score 0   Q1: Little interest or pleasure in doing things Not at all   Q2: Feeling down, depressed or hopeless Not at all   PHQ-2 Score 0       Patient-reported         12/5/2024   Substance Use   Alcohol more than 3/day or more than 7/wk Not Applicable  "  Do you use any other substances recreationally? No      Social History     Tobacco Use    Smoking status: Never    Smokeless tobacco: Never   Vaping Use    Vaping status: Never Used   Substance Use Topics    Alcohol use: Not Currently    Drug use: Not Currently         12/5/2024   One time HIV Screening   Previous HIV test? Yes          12/5/2024   STI Screening   New sexual partner(s) since last STI/HIV test? No      ASCVD Risk   The 10-year ASCVD risk score (Meredith MEEKS, et al., 2019) is: 3.9%    Values used to calculate the score:      Age: 49 years      Sex: Male      Is Non- : No      Diabetic: No      Tobacco smoker: No      Systolic Blood Pressure: 116 mmHg      Is BP treated: No      HDL Cholesterol: 43 mg/dL      Total Cholesterol: 204 mg/dL    Reviewed and updated as needed this visit by Provider   Tobacco  Allergies  Meds  Problems  Med Hx  Surg Hx  Fam Hx             Objective    Exam  /88   Pulse 84   Temp 98.4  F (36.9  C) (Temporal)   Wt 80.6 kg (177 lb 12.8 oz)   SpO2 98%   BMI 23.46 kg/m     Estimated body mass index is 23.46 kg/m  as calculated from the following:    Height as of 10/8/24: 1.854 m (6' 1\").    Weight as of this encounter: 80.6 kg (177 lb 12.8 oz).    Physical Exam  GENERAL: In no distress.  EYES: Conjunctivae/corneas clear. EOMs grossly intact.  HENT: NC/AT, facies symmetric. Neck supple. No LAD or thyromegaly noted.  RESP: CTAB. No w/r/r.  CV: RRR, no m/r/g.  GI: NT, ND, without rebound or guarding, no CVA tenderness, no hepatomegaly appreciated.  MSK: Moves all four extremities freely.  SKIN: No significant ulcers, lesions or rashes on the visualized portions of the skin  NEURO: Alert. Oriented.  PSYCH: Linear thought process. Speech normal rate and volume. No tangential thoughts, hallucinations, or delusions.  "

## 2024-12-05 NOTE — PATIENT INSTRUCTIONS
- I will send you a message on [facility] when I am able to look at the results of your tests from today    - Keep walking!    - Try to eat more fruits and vegetables!    - Don't forget to return the stool kit mailed to you so we can screen you for colon cancer!

## 2025-01-11 LAB — NONINV COLON CA DNA+OCC BLD SCRN STL QL: NEGATIVE

## 2025-06-16 DIAGNOSIS — H04.129 DRY EYE: ICD-10-CM

## 2025-06-19 RX ORDER — CARBOXYMETHYLCELLULOSE SODIUM 5 MG/ML
1 SOLUTION/ DROPS OPHTHALMIC 3 TIMES DAILY PRN
Qty: 30 EACH | Refills: 1 | Status: SHIPPED | OUTPATIENT
Start: 2025-06-19

## 2025-06-19 NOTE — TELEPHONE ENCOUNTER
"carboxymethylcellulose PF (CARBOXYMETHYLCELLULOSE SODIUM) 0.5 % ophthalmic solution   Start: 06/25/2024   Disp 70 R 11  Place 1 drop into both eyes 3 times daily as needed for dry eyes     Hannah Landis, OD  Optometry  Lv 6/5/24  Nv  none      Refill decision: Medication unable to be refilled by RN due to: Other: due for 1 year appt. med list and requested tid prn. Per note \"Start AT 3-4x/day OU \"      Request from pharmacy:  Requested Prescriptions   Pending Prescriptions Disp Refills    CARBOXYMETHYLCELLULOSE SODIUM 0.5 % ophthalmic solution [Pharmacy Med Name: REFRESH PLUS 0.5 % OPHTHALMIC SOLUTION]       Sig: PLACE 1 DROP INTO BOTH EYES 3 TIMES DAILY AS NEEDED FOR DRY EYES       There is no refill protocol information for this order            "

## 2025-06-19 NOTE — TELEPHONE ENCOUNTER
Rx sent with a refill    Note to clinic coordinator to assist in reaching out and scheduling yearly DM exam with Dr. Boby Stevenson, ROSE MARIE 1:04 PM 06/19/25

## 2025-06-23 ENCOUNTER — TELEPHONE (OUTPATIENT)
Dept: OPHTHALMOLOGY | Facility: CLINIC | Age: 50
End: 2025-06-23
Payer: COMMERCIAL

## 2025-06-23 NOTE — TELEPHONE ENCOUNTER
Left Voicemail (1st Attempt) for the patient to call back and schedule the following:    Appointment type: RETURN ADULT EYE  Provider:    Return date: after 6/26/25  Eye Clinic phone number: [phone]  Additional appointment(s) needed: NONE  Additional Notes: Yearly DM exam with Dr. Landis after 6/26/25 and medication refill.       LVM via  call and made recall to schedule from .

## 2025-07-31 ENCOUNTER — OFFICE VISIT (OUTPATIENT)
Dept: OPHTHALMOLOGY | Facility: CLINIC | Age: 50
End: 2025-07-31
Payer: COMMERCIAL

## 2025-07-31 DIAGNOSIS — E11.9 TYPE 2 DIABETES MELLITUS WITHOUT COMPLICATION, WITHOUT LONG-TERM CURRENT USE OF INSULIN (H): ICD-10-CM

## 2025-07-31 DIAGNOSIS — H04.129 DRY EYE: ICD-10-CM

## 2025-07-31 DIAGNOSIS — H25.049 POSTERIOR SUBCAPSULAR AGE-RELATED CATARACT: Primary | ICD-10-CM

## 2025-07-31 RX ORDER — CARBOXYMETHYLCELLULOSE SODIUM 10 MG/ML
1 GEL OPHTHALMIC AT BEDTIME
Qty: 15 ML | Refills: 11 | Status: SHIPPED | OUTPATIENT
Start: 2025-07-31

## 2025-07-31 RX ORDER — CARBOXYMETHYLCELLULOSE SODIUM 5 MG/ML
1 SOLUTION/ DROPS OPHTHALMIC 4 TIMES DAILY PRN
Qty: 70 EACH | Refills: 11 | Status: SHIPPED | OUTPATIENT
Start: 2025-07-31

## 2025-07-31 ASSESSMENT — CONF VISUAL FIELD
OS_INFERIOR_TEMPORAL_RESTRICTION: 0
OS_NORMAL: 1
OS_SUPERIOR_TEMPORAL_RESTRICTION: 0
METHOD: COUNTING FINGERS
OD_NORMAL: 1
OD_SUPERIOR_TEMPORAL_RESTRICTION: 0
OD_INFERIOR_NASAL_RESTRICTION: 0
OD_INFERIOR_TEMPORAL_RESTRICTION: 0
OD_SUPERIOR_NASAL_RESTRICTION: 0
OS_INFERIOR_NASAL_RESTRICTION: 0
OS_SUPERIOR_NASAL_RESTRICTION: 0

## 2025-07-31 ASSESSMENT — TONOMETRY
IOP_METHOD: ICARE
OD_IOP_MMHG: 14
OS_IOP_MMHG: 15

## 2025-07-31 ASSESSMENT — VISUAL ACUITY
OS_SC+: -1
OS_SC: 20/70
OD_SC+: -2
OD_SC: 20/30
METHOD: SNELLEN - LINEAR

## 2025-07-31 ASSESSMENT — REFRACTION_MANIFEST
OS_CYLINDER: +0.25
OD_SPHERE: +0.25
OS_AXIS: 170
OD_AXIS: 168
OS_SPHERE: PLANO
OS_ADD: +2.00
OD_CYLINDER: +0.25
OD_ADD: +2.00

## 2025-07-31 ASSESSMENT — EXTERNAL EXAM - LEFT EYE: OS_EXAM: NORMAL

## 2025-07-31 ASSESSMENT — EXTERNAL EXAM - RIGHT EYE: OD_EXAM: NORMAL

## 2025-07-31 ASSESSMENT — SLIT LAMP EXAM - LIDS
COMMENTS: 2+ MGD
COMMENTS: 2+ MGD

## 2025-07-31 ASSESSMENT — CUP TO DISC RATIO
OS_RATIO: 0.45
OD_RATIO: 0.45

## 2025-07-31 NOTE — PROGRESS NOTES
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  BCVA dropped one line from last year now 20/30 right, 20/60 left  -Reviewed findings with pt including option for cataract surgery referral left eye - he is now bothered by left eye vision and would like to discuss cataract surgery    3.) Dry Eye OU  -AT 3-4x/day OU  -Add gel at night    RTC n/a for cataract surgery eval left eye    I have confirmed the patient's CC, HPI and reviewed Past Medical History, Past Surgical History, Social History, Family History, Problem List, Medication List and agree with Tech note.     Hannah Lanids, SULAIMAN CERDAO JITENDRAS

## 2025-08-04 ENCOUNTER — OFFICE VISIT (OUTPATIENT)
Dept: OPHTHALMOLOGY | Facility: CLINIC | Age: 50
End: 2025-08-04
Attending: OPHTHALMOLOGY
Payer: COMMERCIAL

## 2025-08-04 DIAGNOSIS — H33.322 ROUND HOLE OF LEFT RETINA WITHOUT DETACHMENT: ICD-10-CM

## 2025-08-04 DIAGNOSIS — H25.049 POSTERIOR SUBCAPSULAR AGE-RELATED CATARACT: Primary | ICD-10-CM

## 2025-08-04 DIAGNOSIS — H25.13 NUCLEAR SENILE CATARACT OF BOTH EYES: ICD-10-CM

## 2025-08-04 PROCEDURE — 92025 CPTRIZED CORNEAL TOPOGRAPHY: CPT | Performed by: OPHTHALMOLOGY

## 2025-08-04 PROCEDURE — 67145 PROPH RTA DTCHMNT PC: CPT | Mod: LT | Performed by: OPHTHALMOLOGY

## 2025-08-04 PROCEDURE — 92015 DETERMINE REFRACTIVE STATE: CPT

## 2025-08-04 PROCEDURE — G0463 HOSPITAL OUTPT CLINIC VISIT: HCPCS | Performed by: OPHTHALMOLOGY

## 2025-08-04 PROCEDURE — 99204 OFFICE O/P NEW MOD 45 MIN: CPT | Mod: 25 | Performed by: OPHTHALMOLOGY

## 2025-08-04 PROCEDURE — 76519 ECHO EXAM OF EYE: CPT | Performed by: OPHTHALMOLOGY

## 2025-08-04 ASSESSMENT — REFRACTION_WEARINGRX
SPECS_TYPE: LAST MRX
OD_ADD: +1.75
OD_CYLINDER: +0.50
OS_ADD: +1.75
OD_AXIS: 180
OD_SPHERE: +0.50
OS_AXIS: 170
OS_CYLINDER: +0.25
OS_SPHERE: PLANO

## 2025-08-04 ASSESSMENT — REFRACTION_MANIFEST
OD_SPHERE: PLANO
OS_SPHERE: -3.00
OD_CYLINDER: +0.75
OD_CYLINDER: +0.75
OS_CYLINDER: +2.75
OD_AXIS: 175
OS_AXIS: 015
OD_AXIS: 172
OD_SPHERE: -0.50
OS_AXIS: 075
OS_ADD: +2.50
OS_SPHERE: -0.75
OD_ADD: +2.50
OS_CYLINDER: +1.25

## 2025-08-04 ASSESSMENT — VISUAL ACUITY
OS_PH_SC+: -3
OD_SC: 20/30
OS_PH_SC: 20/50
OS_SC: 20/80
METHOD: SNELLEN - LINEAR

## 2025-08-04 ASSESSMENT — TONOMETRY
OD_IOP_MMHG: 10
OS_IOP_MMHG: 10
IOP_METHOD: TONOPEN

## 2025-08-04 ASSESSMENT — CONF VISUAL FIELD
OS_SUPERIOR_NASAL_RESTRICTION: 0
OD_INFERIOR_NASAL_RESTRICTION: 0
OD_SUPERIOR_TEMPORAL_RESTRICTION: 0
OD_SUPERIOR_NASAL_RESTRICTION: 0
METHOD: COUNTING FINGERS
OS_SUPERIOR_TEMPORAL_RESTRICTION: 0
OS_INFERIOR_NASAL_RESTRICTION: 0
OD_INFERIOR_TEMPORAL_RESTRICTION: 0
OD_NORMAL: 1
OS_NORMAL: 1
OS_INFERIOR_TEMPORAL_RESTRICTION: 0

## 2025-08-04 ASSESSMENT — SLIT LAMP EXAM - LIDS
COMMENTS: MEIBOMIAN GLAND DYSFUNCTION
COMMENTS: MEIBOMIAN GLAND DYSFUNCTION

## 2025-08-05 ENCOUNTER — APPOINTMENT (OUTPATIENT)
Dept: INTERPRETER SERVICES | Facility: CLINIC | Age: 50
End: 2025-08-05
Payer: COMMERCIAL

## 2025-08-05 ENCOUNTER — OFFICE VISIT (OUTPATIENT)
Dept: URGENT CARE | Facility: URGENT CARE | Age: 50
End: 2025-08-05

## 2025-08-05 ENCOUNTER — TELEPHONE (OUTPATIENT)
Dept: OPHTHALMOLOGY | Facility: CLINIC | Age: 50
End: 2025-08-05
Payer: COMMERCIAL

## 2025-08-05 VITALS
BODY MASS INDEX: 23.46 KG/M2 | HEIGHT: 73 IN | OXYGEN SATURATION: 100 % | TEMPERATURE: 97.9 F | DIASTOLIC BLOOD PRESSURE: 98 MMHG | RESPIRATION RATE: 18 BRPM | WEIGHT: 177 LBS | HEART RATE: 67 BPM | SYSTOLIC BLOOD PRESSURE: 142 MMHG

## 2025-08-05 DIAGNOSIS — L72.3 SEBACEOUS CYST: Primary | ICD-10-CM

## 2025-08-05 PROBLEM — H25.13 NUCLEAR SENILE CATARACT OF BOTH EYES: Status: ACTIVE | Noted: 2025-08-04

## 2025-08-05 PROBLEM — H25.049 POSTERIOR SUBCAPSULAR AGE-RELATED CATARACT: Status: ACTIVE | Noted: 2025-08-04

## 2025-08-05 PROCEDURE — 99213 OFFICE O/P EST LOW 20 MIN: CPT | Performed by: PHYSICIAN ASSISTANT

## 2025-08-05 RX ORDER — DOXYCYCLINE HYCLATE 100 MG
100 TABLET ORAL 2 TIMES DAILY
Qty: 14 TABLET | Refills: 0 | Status: SHIPPED | OUTPATIENT
Start: 2025-08-05 | End: 2025-08-12

## 2025-08-06 ENCOUNTER — PATIENT OUTREACH (OUTPATIENT)
Dept: CARE COORDINATION | Facility: CLINIC | Age: 50
End: 2025-08-06

## 2025-08-09 ENCOUNTER — OFFICE VISIT (OUTPATIENT)
Dept: URGENT CARE | Facility: URGENT CARE | Age: 50
End: 2025-08-09
Payer: COMMERCIAL

## 2025-08-09 VITALS
RESPIRATION RATE: 22 BRPM | TEMPERATURE: 96.2 F | DIASTOLIC BLOOD PRESSURE: 90 MMHG | HEART RATE: 59 BPM | WEIGHT: 177.4 LBS | OXYGEN SATURATION: 100 % | SYSTOLIC BLOOD PRESSURE: 133 MMHG | BODY MASS INDEX: 23.41 KG/M2

## 2025-08-09 DIAGNOSIS — L08.9 INFECTED SEBACEOUS CYST OF SKIN: Primary | ICD-10-CM

## 2025-08-09 DIAGNOSIS — L72.3 INFECTED SEBACEOUS CYST OF SKIN: Primary | ICD-10-CM

## 2025-08-09 PROCEDURE — 99213 OFFICE O/P EST LOW 20 MIN: CPT | Mod: 25 | Performed by: INTERNAL MEDICINE

## 2025-08-09 PROCEDURE — 10060 I&D ABSCESS SIMPLE/SINGLE: CPT | Performed by: INTERNAL MEDICINE

## 2025-08-09 PROCEDURE — 3080F DIAST BP >= 90 MM HG: CPT | Performed by: INTERNAL MEDICINE

## 2025-08-09 PROCEDURE — 3075F SYST BP GE 130 - 139MM HG: CPT | Performed by: INTERNAL MEDICINE

## 2025-08-09 RX ORDER — LIDOCAINE HYDROCHLORIDE 10 MG/ML
5 INJECTION, SOLUTION EPIDURAL; INFILTRATION; INTRACAUDAL; PERINEURAL ONCE
Status: COMPLETED | OUTPATIENT
Start: 2025-08-09 | End: 2025-08-09

## 2025-08-09 RX ADMIN — LIDOCAINE HYDROCHLORIDE 5 ML: 10 INJECTION, SOLUTION EPIDURAL; INFILTRATION; INTRACAUDAL; PERINEURAL at 11:34

## 2025-08-10 ENCOUNTER — ANCILLARY PROCEDURE (OUTPATIENT)
Dept: MRI IMAGING | Facility: CLINIC | Age: 50
End: 2025-08-10
Attending: NURSE PRACTITIONER
Payer: COMMERCIAL

## 2025-08-10 DIAGNOSIS — Z87.828 HISTORY OF TRAUMATIC INJURY OF HEAD: ICD-10-CM

## 2025-08-10 DIAGNOSIS — M77.8 RIGHT SHOULDER TENDONITIS: ICD-10-CM

## 2025-08-10 PROCEDURE — 73221 MRI JOINT UPR EXTREM W/O DYE: CPT | Mod: RT | Performed by: RADIOLOGY

## 2025-08-10 PROCEDURE — 70549 MR ANGIOGRAPH NECK W/O&W/DYE: CPT | Mod: GC | Performed by: STUDENT IN AN ORGANIZED HEALTH CARE EDUCATION/TRAINING PROGRAM

## 2025-08-10 PROCEDURE — 70544 MR ANGIOGRAPHY HEAD W/O DYE: CPT | Mod: GC | Performed by: STUDENT IN AN ORGANIZED HEALTH CARE EDUCATION/TRAINING PROGRAM

## 2025-08-10 PROCEDURE — A9585 GADOBUTROL INJECTION: HCPCS | Mod: JZ | Performed by: STUDENT IN AN ORGANIZED HEALTH CARE EDUCATION/TRAINING PROGRAM

## 2025-08-10 RX ORDER — GADOBUTROL 604.72 MG/ML
7.5 INJECTION INTRAVENOUS ONCE
Status: COMPLETED | OUTPATIENT
Start: 2025-08-10 | End: 2025-08-10

## 2025-08-10 RX ADMIN — GADOBUTROL 7.5 ML: 604.72 INJECTION INTRAVENOUS at 09:17

## 2025-08-11 ENCOUNTER — OFFICE VISIT (OUTPATIENT)
Dept: OPHTHALMOLOGY | Facility: CLINIC | Age: 50
End: 2025-08-11
Attending: OPHTHALMOLOGY
Payer: COMMERCIAL

## 2025-08-11 DIAGNOSIS — H33.322 ROUND HOLE OF LEFT RETINA WITHOUT DETACHMENT: Primary | ICD-10-CM

## 2025-08-11 PROCEDURE — 99214 OFFICE O/P EST MOD 30 MIN: CPT | Mod: 24 | Performed by: OPHTHALMOLOGY

## 2025-08-11 PROCEDURE — 67145 PROPH RTA DTCHMNT PC: CPT | Mod: LT | Performed by: OPHTHALMOLOGY

## 2025-08-11 PROCEDURE — G0463 HOSPITAL OUTPT CLINIC VISIT: HCPCS | Performed by: OPHTHALMOLOGY

## 2025-08-11 ASSESSMENT — SLIT LAMP EXAM - LIDS
COMMENTS: MEIBOMIAN GLAND DYSFUNCTION
COMMENTS: MEIBOMIAN GLAND DYSFUNCTION

## 2025-08-11 ASSESSMENT — TONOMETRY
IOP_METHOD: ICARE
OS_IOP_MMHG: 11
OD_IOP_MMHG: 13

## 2025-08-11 ASSESSMENT — VISUAL ACUITY
OD_SC+: -2
OS_SC: 20/150
METHOD: SNELLEN - LINEAR
OD_SC: 20/25

## 2025-08-12 DIAGNOSIS — H04.129 DRY EYE: ICD-10-CM

## 2025-08-14 RX ORDER — CARBOXYMETHYLCELLULOSE SODIUM 5 MG/ML
1 SOLUTION/ DROPS OPHTHALMIC 3 TIMES DAILY PRN
OUTPATIENT
Start: 2025-08-14

## 2025-08-16 ENCOUNTER — OFFICE VISIT (OUTPATIENT)
Dept: URGENT CARE | Facility: URGENT CARE | Age: 50
End: 2025-08-16
Payer: COMMERCIAL

## 2025-08-16 VITALS
HEIGHT: 72 IN | OXYGEN SATURATION: 99 % | HEART RATE: 83 BPM | RESPIRATION RATE: 19 BRPM | DIASTOLIC BLOOD PRESSURE: 88 MMHG | SYSTOLIC BLOOD PRESSURE: 125 MMHG | WEIGHT: 175.2 LBS | TEMPERATURE: 98.7 F | BODY MASS INDEX: 23.73 KG/M2

## 2025-08-16 DIAGNOSIS — L02.01 FACIAL ABSCESS: Primary | ICD-10-CM

## 2025-08-16 PROCEDURE — 99213 OFFICE O/P EST LOW 20 MIN: CPT | Performed by: PHYSICIAN ASSISTANT

## 2025-08-16 PROCEDURE — 3074F SYST BP LT 130 MM HG: CPT | Performed by: PHYSICIAN ASSISTANT

## 2025-08-16 PROCEDURE — 3079F DIAST BP 80-89 MM HG: CPT | Performed by: PHYSICIAN ASSISTANT

## 2025-08-16 RX ORDER — DOXYCYCLINE 100 MG/1
100 CAPSULE ORAL 2 TIMES DAILY
Qty: 14 CAPSULE | Refills: 0 | Status: SHIPPED | OUTPATIENT
Start: 2025-08-16 | End: 2025-08-23